# Patient Record
Sex: FEMALE | Race: OTHER
[De-identification: names, ages, dates, MRNs, and addresses within clinical notes are randomized per-mention and may not be internally consistent; named-entity substitution may affect disease eponyms.]

---

## 2017-02-22 ENCOUNTER — RECORD ABSTRACTING (OUTPATIENT)
Age: 72
End: 2017-02-22

## 2017-02-22 DIAGNOSIS — Z78.9 OTHER SPECIFIED HEALTH STATUS: ICD-10-CM

## 2017-02-22 DIAGNOSIS — I10 ESSENTIAL (PRIMARY) HYPERTENSION: ICD-10-CM

## 2017-02-22 DIAGNOSIS — E78.00 PURE HYPERCHOLESTEROLEMIA, UNSPECIFIED: ICD-10-CM

## 2017-02-22 RX ORDER — IBUPROFEN 800 MG/1
TABLET, FILM COATED ORAL
Refills: 0 | Status: ACTIVE | COMMUNITY

## 2017-02-22 RX ORDER — SIMVASTATIN 80 MG/1
TABLET, FILM COATED ORAL
Refills: 0 | Status: ACTIVE | COMMUNITY

## 2017-02-22 RX ORDER — LISINOPRIL 30 MG/1
TABLET ORAL
Refills: 0 | Status: ACTIVE | COMMUNITY

## 2017-05-30 ENCOUNTER — OUTPATIENT (OUTPATIENT)
Dept: OUTPATIENT SERVICES | Facility: HOSPITAL | Age: 72
LOS: 1 days | Discharge: HOME | End: 2017-05-30

## 2017-06-28 DIAGNOSIS — M25.711 OSTEOPHYTE, RIGHT SHOULDER: ICD-10-CM

## 2019-10-26 ENCOUNTER — EMERGENCY (EMERGENCY)
Facility: HOSPITAL | Age: 74
LOS: 0 days | Discharge: HOME | End: 2019-10-26
Admitting: EMERGENCY MEDICINE
Payer: MEDICARE

## 2019-10-26 VITALS
HEART RATE: 66 BPM | DIASTOLIC BLOOD PRESSURE: 84 MMHG | SYSTOLIC BLOOD PRESSURE: 197 MMHG | OXYGEN SATURATION: 96 % | RESPIRATION RATE: 18 BRPM | TEMPERATURE: 98 F

## 2019-10-26 DIAGNOSIS — Z88.2 ALLERGY STATUS TO SULFONAMIDES: ICD-10-CM

## 2019-10-26 DIAGNOSIS — H91.91 UNSPECIFIED HEARING LOSS, RIGHT EAR: ICD-10-CM

## 2019-10-26 DIAGNOSIS — H92.09 OTALGIA, UNSPECIFIED EAR: ICD-10-CM

## 2019-10-26 DIAGNOSIS — H61.21 IMPACTED CERUMEN, RIGHT EAR: ICD-10-CM

## 2019-10-26 PROCEDURE — 99282 EMERGENCY DEPT VISIT SF MDM: CPT

## 2019-10-26 RX ORDER — CARBAMIDE PEROXIDE 81.86 MG/ML
1 SOLUTION/ DROPS AURICULAR (OTIC) ONCE
Refills: 0 | Status: COMPLETED | OUTPATIENT
Start: 2019-10-26 | End: 2019-10-26

## 2019-10-26 RX ADMIN — CARBAMIDE PEROXIDE 1 DROP(S): 81.86 SOLUTION/ DROPS AURICULAR (OTIC) at 10:32

## 2019-10-26 NOTE — ED ADULT NURSE NOTE - OBJECTIVE STATEMENT
pt 73 y/o female presents to ED c/o left ear pain for 4 days , pt denies any fevers or drainage, denies any other pain. pt states it is harder to hear on left side.

## 2019-10-26 NOTE — ED PROVIDER NOTE - PHYSICAL EXAMINATION
CONSTITUTIONAL: Well-appearing; well-nourished; in no apparent distress.   EYES: PERRL; EOM intact.   ENT: Left ear: small amount of cerumen noted. Clear TM. Rt ear: no mastoid ttp. + cerumen impaction. No discharge. No erythema. Cannot visualize TM due to cerumen. normal nose; no rhinorrhea; normal pharynx with no tonsillar hypertrophy.   NECK: Supple; non-tender; no cervical lymphadenopathy.   CARDIOVASCULAR: Normal S1, S2; no murmurs, rubs, or gallops.   RESPIRATORY: Normal chest excursion with respiration; breath sounds clear and equal bilaterally; no wheezes, rhonchi, or rales.  MS: No evidence of trauma or deformity. Normal ROM in all four extremities; non-tender to palpation; distal pulses are normal.   SKIN: Normal for age and race; warm; dry; good turgor; no apparent lesions or exudate.   NEURO/PSYCH: A & O x 4; grossly unremarkable. mood and manner are appropriate.

## 2019-10-26 NOTE — ED PROVIDER NOTE - OBJECTIVE STATEMENT
74 year old F with hx of HTN c/o rt sided ear fullness/discomfort and decreased hearing x 3- 4 days. Denies any  fever/chills, headache, nausea, vomiting, discharge, uri symptoms, cough, sob, hx of ear infections.

## 2019-10-26 NOTE — ED PROVIDER NOTE - NS ED ROS FT
Constitutional: no fever, chills, no recent weight loss, change in appetite or malaise  Eyes: no redness/discharge/pain/vision changes  ENT: + L sided ear fullness/decreased hearing. no rhinorrhea/sore throat  Cardiac: No chest pain, SOB or edema.  Respiratory: No cough or respiratory distress  GI: No nausea, vomiting, diarrhea or abdominal pain.  MS: no pain to back or extremities, no loss of ROM, no weakness  Neuro: No headache or weakness.   Skin: No skin rash.  Except as documented in the HPI, all other systems are negative.

## 2019-10-26 NOTE — ED PROVIDER NOTE - PATIENT PORTAL LINK FT
You can access the FollowMyHealth Patient Portal offered by [facility] by registering at the following website: [email or web address]. By joining [employer]’s FollowMyHealth portal, you will also be able to view your health information using other applications (apps) compatible with our system.

## 2019-10-26 NOTE — ED ADULT NURSE NOTE - CHIEF COMPLAINT
LOLITA STELEEVJ  : 1929 15:35:01  ACCOUNT:  [id number]  HOME PHONE:  [phone]  WORK PHONE:       Received message to call patient with carotid US results. Attempted to call patient several times without success. Left another voicemail with results.    Per MVD review of carotid US:  Mild, No change, CPM    Blaire Irwin RN     The patient is a 74y Female complaining of ear pain.

## 2022-05-30 ENCOUNTER — EMERGENCY (EMERGENCY)
Facility: HOSPITAL | Age: 77
LOS: 0 days | Discharge: HOME | End: 2022-05-30
Attending: EMERGENCY MEDICINE | Admitting: EMERGENCY MEDICINE
Payer: MEDICARE

## 2022-05-30 VITALS
HEIGHT: 60 IN | TEMPERATURE: 98 F | DIASTOLIC BLOOD PRESSURE: 76 MMHG | HEART RATE: 76 BPM | RESPIRATION RATE: 18 BRPM | SYSTOLIC BLOOD PRESSURE: 171 MMHG | WEIGHT: 154.98 LBS | OXYGEN SATURATION: 98 %

## 2022-05-30 DIAGNOSIS — R30.0 DYSURIA: ICD-10-CM

## 2022-05-30 DIAGNOSIS — I10 ESSENTIAL (PRIMARY) HYPERTENSION: ICD-10-CM

## 2022-05-30 DIAGNOSIS — R10.30 LOWER ABDOMINAL PAIN, UNSPECIFIED: ICD-10-CM

## 2022-05-30 DIAGNOSIS — E78.5 HYPERLIPIDEMIA, UNSPECIFIED: ICD-10-CM

## 2022-05-30 DIAGNOSIS — E11.9 TYPE 2 DIABETES MELLITUS WITHOUT COMPLICATIONS: ICD-10-CM

## 2022-05-30 DIAGNOSIS — Z88.2 ALLERGY STATUS TO SULFONAMIDES: ICD-10-CM

## 2022-05-30 DIAGNOSIS — Z88.8 ALLERGY STATUS TO OTHER DRUGS, MEDICAMENTS AND BIOLOGICAL SUBSTANCES: ICD-10-CM

## 2022-05-30 LAB
APPEARANCE UR: CLEAR — SIGNIFICANT CHANGE UP
BACTERIA # UR AUTO: NEGATIVE — SIGNIFICANT CHANGE UP
BILIRUB UR-MCNC: NEGATIVE — SIGNIFICANT CHANGE UP
COLOR SPEC: YELLOW — SIGNIFICANT CHANGE UP
DIFF PNL FLD: NEGATIVE — SIGNIFICANT CHANGE UP
EPI CELLS # UR: 1 /HPF — SIGNIFICANT CHANGE UP (ref 0–5)
GLUCOSE UR QL: NEGATIVE — SIGNIFICANT CHANGE UP
HYALINE CASTS # UR AUTO: 1 /LPF — SIGNIFICANT CHANGE UP (ref 0–7)
KETONES UR-MCNC: NEGATIVE — SIGNIFICANT CHANGE UP
LEUKOCYTE ESTERASE UR-ACNC: ABNORMAL
NITRITE UR-MCNC: NEGATIVE — SIGNIFICANT CHANGE UP
PH UR: 8 — SIGNIFICANT CHANGE UP (ref 5–8)
PROT UR-MCNC: NEGATIVE — SIGNIFICANT CHANGE UP
RBC CASTS # UR COMP ASSIST: 4 /HPF — SIGNIFICANT CHANGE UP (ref 0–4)
SP GR SPEC: 1.01 — SIGNIFICANT CHANGE UP (ref 1.01–1.03)
UROBILINOGEN FLD QL: SIGNIFICANT CHANGE UP
WBC UR QL: 4 /HPF — SIGNIFICANT CHANGE UP (ref 0–5)

## 2022-05-30 PROCEDURE — 99284 EMERGENCY DEPT VISIT MOD MDM: CPT

## 2022-05-30 RX ORDER — NITROFURANTOIN MACROCRYSTAL 50 MG
1 CAPSULE ORAL
Qty: 10 | Refills: 0
Start: 2022-05-30 | End: 2022-06-03

## 2022-05-30 NOTE — ED PROVIDER NOTE - ATTENDING CONTRIBUTION TO CARE
I personally evaluated the patient. I reviewed the Resident´s or Physician Assistant´s note (as assigned above), and agree with the findings and plan except as documented in my note.    76-year-old female presents emergency department for dysuria.  Denies constitutional symptoms fever chills or back pain.  No vomiting, no GI symptoms.  No GYN symptoms.    Has minimal outpatient GYN work-up to date.    The review of systems otherwise unremarkable  GENERAL: female in no distress.   HEENT: EOMI non icteric   CHEST: normal work of breathing noted. CTA bilateral.   CV: pulses intact S1S2 regular  ABD: soft, non rigid, non distended no rebound  NEURO: AAO 3 no focal deficits  SKIN: normal no pallor  PSYCH: normal mood & mentation    Impression: Dysuria    Plan: Labs, reevaluation

## 2022-05-30 NOTE — ED PROVIDER NOTE - NSFOLLOWUPINSTRUCTIONS_ED_ALL_ED_FT
Dysuria      Dysuria is pain or discomfort during urination. The pain or discomfort may be felt in the part of the body that drains urine from the bladder (urethra) or in the surrounding tissue of the genitals. The pain may also be felt in the groin area, lower abdomen, or lower back.    You may have to urinate frequently or have the sudden feeling that you have to urinate (urgency). Dysuria can affect anyone, but it is more common in females. Dysuria can be caused by many different things, including:  •Urinary tract infection.      •Kidney stones or bladder stones.      •Certain STIs (sexually transmitted infections), such as chlamydia.      •Dehydration.      •Inflammation of the tissues of the vagina.      •Use of certain medicines.      •Use of certain soaps or scented products that cause irritation.        Follow these instructions at home:    Medicines     •Take over-the-counter and prescription medicines only as told by your health care provider.      •If you were prescribed an antibiotic medicine, take it as told by your health care provider. Do not stop taking the antibiotic even if you start to feel better.        Eating and drinking      •Drink enough fluid to keep your urine pale yellow.      •Avoid caffeinated beverages, tea, and alcohol. These beverages can irritate the bladder and make dysuria worse. In males, alcohol may irritate the prostate.      General instructions     •Watch your condition for any changes.      •Urinate often. Avoid holding urine for long periods of time.      •If you are female, you should wipe from front to back after urinating or having a bowel movement. Use each piece of toilet paper only once.      •Empty your bladder after sex.      •Keep all follow-up visits. This is important.      •If you had any tests done to find the cause of dysuria, it is up to you to get your test results. Ask your health care provider, or the department that is doing the test, when your results will be ready.        Contact a health care provider if:    •You have a fever.      •You develop pain in your back or sides.      •You have nausea or vomiting.      •You have blood in your urine.      •You are not urinating as often as you usually do.        Get help right away if:    •Your pain is severe and not relieved with medicines.      •You cannot eat or drink without vomiting.      •You are confused.      •You have a rapid heartbeat while resting.      •You have shaking or chills.      •You feel extremely weak.        Summary    •Dysuria is pain or discomfort while urinating. Many different conditions can lead to dysuria.      •If you have dysuria, you may have to urinate frequently or have the sudden feeling that you have to urinate (urgency).      •Watch your condition for any changes. Keep all follow-up visits.      •Make sure that you urinate often and drink enough fluid to keep your urine pale yellow.      This information is not intended to replace advice given to you by your health care provider. Make sure you discuss any questions you have with your health care provider.

## 2022-05-30 NOTE — ED PROVIDER NOTE - NS ED ROS FT
GEN:  no fever, no chills, no generalized weakness  NEURO:  no dizziness  GI:  no nausea, no vomiting, +abdominal pain  :  +dysuria, no urinary frequency, no hematuria, +vaginal spotting  MSK:  no joint pain, no edema  SKIN:  no rash, no bruising

## 2022-05-30 NOTE — ED PROVIDER NOTE - PATIENT PORTAL LINK FT
You can access the FollowMyHealth Patient Portal offered by [facility] by registering at the following website: [email or web address]. By joining [employer]’s FollowMyHealth portal, you will also be able to view your health information using other applications (apps) compatible with our system.

## 2022-05-30 NOTE — ED PROVIDER NOTE - NSFOLLOWUPCLINICS_GEN_ALL_ED_FT
[facility]  OB/GYN  [address] [place] [address]  Phone: [phone]  Fax:   Follow Up Time: 1-3 Days

## 2022-05-30 NOTE — ED PROVIDER NOTE - CLINICAL SUMMARY MEDICAL DECISION MAKING FREE TEXT BOX
68-year female presents the emergency department for dysuria.  Had screening exam, labs, reevaluation.  Urine demonstrates minimal white cells but small leukocyte esterase, GYN exam reveals no source of dysuria.  Will treat prophylactically with Macrobid and can continue as outpatient follow-up with, likely early UTI.  Patient suggested to see outpatient GYN for screening examinations as well.

## 2022-05-30 NOTE — ED PROVIDER NOTE - OBJECTIVE STATEMENT
75 yo female, PMHx of DM, HLD, HTN, presents with dysuria x6 days, worsening over time, no alleviating factors, associated with vaginal spotting, suprapubic abdominal discomfort, foul odor. Denies fevers, chill, back pain, nausea, vomiting, hematuria.

## 2022-05-30 NOTE — ED ADULT NURSE NOTE - NSIMPLEMENTINTERV_GEN_ALL_ED
Implemented All Universal Safety Interventions:  [place] to call system. Call bell, personal items and telephone within reach. Instruct patient to call for assistance. Room bathroom lighting operational. Non-slip footwear when patient is off stretcher. Physically safe environment: no spills, clutter or unnecessary equipment. Stretcher in lowest position, wheels locked, appropriate side rails in place.

## 2022-05-30 NOTE — ED PROVIDER NOTE - PHYSICAL EXAMINATION
CONSTITUTIONAL: Well-developed; well-nourished; in no acute distress.   SKIN: warm, dry  HEAD: Normocephalic  NECK: Supple  CARD: S1, S2 normal; no murmurs, gallops, or rubs. Regular rate and rhythm.   RESP: No wheezes, rales or rhonchi.  ABD: soft, nondistended. +suprapubic tenderness. no cva tenderness  : no blood or discharge. no adnexal tenderness  EXT: Normal ROM.  No clubbing, cyanosis or edema.   NEURO: Alert, oriented, grossly unremarkable.  PSYCH: Cooperative, appropriate.

## 2022-05-31 LAB
CULTURE RESULTS: SIGNIFICANT CHANGE UP
SPECIMEN SOURCE: SIGNIFICANT CHANGE UP

## 2022-06-03 ENCOUNTER — APPOINTMENT (OUTPATIENT)
Dept: OBGYN | Facility: CLINIC | Age: 77
End: 2022-06-03

## 2022-06-03 ENCOUNTER — OUTPATIENT (OUTPATIENT)
Dept: OUTPATIENT SERVICES | Facility: HOSPITAL | Age: 77
LOS: 1 days | Discharge: HOME | End: 2022-06-03

## 2022-06-03 VITALS
DIASTOLIC BLOOD PRESSURE: 70 MMHG | SYSTOLIC BLOOD PRESSURE: 110 MMHG | BODY MASS INDEX: 30.43 KG/M2 | HEIGHT: 60 IN | WEIGHT: 155 LBS

## 2022-06-03 DIAGNOSIS — Z00.00 ENCOUNTER FOR GENERAL ADULT MEDICAL EXAMINATION W/OUT ABNORMAL FINDINGS: ICD-10-CM

## 2022-06-03 DIAGNOSIS — N36.2 URETHRAL CARUNCLE: ICD-10-CM

## 2022-06-03 DIAGNOSIS — Z01.419 ENCOUNTER FOR GYNECOLOGICAL EXAMINATION (GENERAL) (ROUTINE) W/OUT ABNORMAL FINDINGS: ICD-10-CM

## 2022-06-03 PROCEDURE — 99387 INIT PM E/M NEW PAT 65+ YRS: CPT | Mod: 25,GC

## 2022-06-03 PROCEDURE — 99213 OFFICE O/P EST LOW 20 MIN: CPT | Mod: 25,GC

## 2022-06-03 PROCEDURE — 58100 BIOPSY OF UTERUS LINING: CPT | Mod: 52,GC

## 2022-06-03 NOTE — COUNSELING
[Lab Results] : lab results [Nutrition/ Exercise/ Weight Management] : nutrition, exercise, weight management

## 2022-06-06 DIAGNOSIS — Z01.419 ENCOUNTER FOR GYNECOLOGICAL EXAMINATION (GENERAL) (ROUTINE) WITHOUT ABNORMAL FINDINGS: ICD-10-CM

## 2022-06-06 DIAGNOSIS — Z00.00 ENCOUNTER FOR GENERAL ADULT MEDICAL EXAMINATION WITHOUT ABNORMAL FINDINGS: ICD-10-CM

## 2022-06-06 DIAGNOSIS — N36.2 URETHRAL CARUNCLE: ICD-10-CM

## 2022-06-07 LAB — HPV HIGH+LOW RISK DNA PNL CVX: NOT DETECTED

## 2022-06-11 LAB — CYTOLOGY CVX/VAG DOC THIN PREP: ABNORMAL

## 2022-07-28 NOTE — PHYSICAL EXAM
[Chaperone Present] : A chaperone was present in the examining room during all aspects of the physical examination [Appropriately responsive] : appropriately responsive [Alert] : alert [No Acute Distress] : no acute distress [Soft] : soft [Non-tender] : non-tender [Non-distended] : non-distended [No Lesions] : no lesions [No Mass] : no mass [Vulvar Atrophy] : vulvar atrophy [Labia Majora] : normal [Labia Minora] : normal [Atrophy] : atrophy [No Bleeding] : There was no active vaginal bleeding [Cervical Stenosis] : cervical stenosis [Normal] : normal [FreeTextEntry3] : 1cm urethral caruncle noted [FreeTextEntry5] : c

## 2022-07-28 NOTE — DISCUSSION/SUMMARY
[FreeTextEntry1] : 77yo P7 here for ED follow up with bleeding likely 2/2 to large urethral caruncle\par \par Bleeding of unknown origin likely 2/2 caruncle\par -bleeding unlikely to be from vagina or uterus, cervix stenotic, sent for TVUS to assess endometrial cavity\par -EMB was attempted but unsuccessful due to cervical stenosis, will f/u endometrial thickness on sono\par -urology referral given\par -f/u 2 months to discuss results\par \par health maintenance\par -script for dexa given\par -papsmear w/ hpv done today\par

## 2022-07-28 NOTE — HISTORY OF PRESENT ILLNESS
[Y] : Positive pregnancy history [No] : Patient does not have concerns regarding sex [Previously active] : previously active [PGxTotal] : 7 [[facility]] : 7 [PGHxPremature] : 0 [PGHxAbortions] : 0 [[facility] CenterxLiving] : 7 [FreeTextEntry1] : [id number]

## 2022-07-28 NOTE — PROCEDURE
[Cervical Pap Smear] : cervical Pap smear [Liquid Base] : liquid base [Tolerated Well] : the patient tolerated the procedure well [No Complications] : there were no complications [Endometrial Biopsy] : Endometrial biopsy [Post-Menop. Bleeding] : post-menopausal bleeding [Risks] : risks [Benefits] : benefits [Alternatives] : alternatives [No Premedication] : No premedication [Tenaculum] : Tenaculum [de-identified] : postmenopausal [de-identified] : procedure unsuccessful, unable to enter stenotic cervical os

## 2022-08-05 ENCOUNTER — APPOINTMENT (OUTPATIENT)
Dept: OBGYN | Facility: CLINIC | Age: 77
End: 2022-08-05

## 2022-08-18 ENCOUNTER — INPATIENT (INPATIENT)
Facility: HOSPITAL | Age: 77
LOS: 5 days | Discharge: HOME HEALTH | End: 2022-08-24
Attending: HOSPITALIST | Admitting: HOSPITALIST

## 2022-08-18 VITALS
HEIGHT: 60 IN | SYSTOLIC BLOOD PRESSURE: 136 MMHG | RESPIRATION RATE: 18 BRPM | HEART RATE: 65 BPM | TEMPERATURE: 99 F | OXYGEN SATURATION: 96 % | DIASTOLIC BLOOD PRESSURE: 64 MMHG | WEIGHT: 149.91 LBS

## 2022-08-18 LAB
ALBUMIN SERPL ELPH-MCNC: 3.6 G/DL — SIGNIFICANT CHANGE UP (ref 3.5–5.2)
ALP SERPL-CCNC: 94 U/L — SIGNIFICANT CHANGE UP (ref 30–115)
ALT FLD-CCNC: 10 U/L — SIGNIFICANT CHANGE UP (ref 0–41)
ANION GAP SERPL CALC-SCNC: 12 MMOL/L — SIGNIFICANT CHANGE UP (ref 7–14)
APPEARANCE UR: CLEAR — SIGNIFICANT CHANGE UP
AST SERPL-CCNC: 16 U/L — SIGNIFICANT CHANGE UP (ref 0–41)
BACTERIA # UR AUTO: ABNORMAL
BASOPHILS # BLD AUTO: 0.03 K/UL — SIGNIFICANT CHANGE UP (ref 0–0.2)
BASOPHILS NFR BLD AUTO: 0.3 % — SIGNIFICANT CHANGE UP (ref 0–1)
BILIRUB SERPL-MCNC: 0.6 MG/DL — SIGNIFICANT CHANGE UP (ref 0.2–1.2)
BILIRUB UR-MCNC: NEGATIVE — SIGNIFICANT CHANGE UP
BUN SERPL-MCNC: 14 MG/DL — SIGNIFICANT CHANGE UP (ref 10–20)
CALCIUM SERPL-MCNC: 8.7 MG/DL — SIGNIFICANT CHANGE UP (ref 8.5–10.1)
CHLORIDE SERPL-SCNC: 97 MMOL/L — LOW (ref 98–110)
CO2 SERPL-SCNC: 25 MMOL/L — SIGNIFICANT CHANGE UP (ref 17–32)
COLOR SPEC: SIGNIFICANT CHANGE UP
CREAT SERPL-MCNC: 0.8 MG/DL — SIGNIFICANT CHANGE UP (ref 0.7–1.5)
DIFF PNL FLD: ABNORMAL
EGFR: 75 ML/MIN/1.73M2 — SIGNIFICANT CHANGE UP
EOSINOPHIL # BLD AUTO: 0 K/UL — SIGNIFICANT CHANGE UP (ref 0–0.7)
EOSINOPHIL NFR BLD AUTO: 0 % — SIGNIFICANT CHANGE UP (ref 0–8)
EPI CELLS # UR: 1 /HPF — SIGNIFICANT CHANGE UP (ref 0–5)
GLUCOSE BLDC GLUCOMTR-MCNC: 129 MG/DL — HIGH (ref 70–99)
GLUCOSE SERPL-MCNC: 111 MG/DL — HIGH (ref 70–99)
GLUCOSE UR QL: NEGATIVE — SIGNIFICANT CHANGE UP
HCT VFR BLD CALC: 34.5 % — LOW (ref 37–47)
HGB BLD-MCNC: 11.5 G/DL — LOW (ref 12–16)
HYALINE CASTS # UR AUTO: 1 /LPF — SIGNIFICANT CHANGE UP (ref 0–7)
IMM GRANULOCYTES NFR BLD AUTO: 0.5 % — HIGH (ref 0.1–0.3)
KETONES UR-MCNC: NEGATIVE — SIGNIFICANT CHANGE UP
LEUKOCYTE ESTERASE UR-ACNC: ABNORMAL
LIDOCAIN IGE QN: 15 U/L — SIGNIFICANT CHANGE UP (ref 7–60)
LYMPHOCYTES # BLD AUTO: 1.54 K/UL — SIGNIFICANT CHANGE UP (ref 1.2–3.4)
LYMPHOCYTES # BLD AUTO: 15.9 % — LOW (ref 20.5–51.1)
MCHC RBC-ENTMCNC: 26.5 PG — LOW (ref 27–31)
MCHC RBC-ENTMCNC: 33.3 G/DL — SIGNIFICANT CHANGE UP (ref 32–37)
MCV RBC AUTO: 79.5 FL — LOW (ref 81–99)
MONOCYTES # BLD AUTO: 0.96 K/UL — HIGH (ref 0.1–0.6)
MONOCYTES NFR BLD AUTO: 9.9 % — HIGH (ref 1.7–9.3)
NEUTROPHILS # BLD AUTO: 7.1 K/UL — HIGH (ref 1.4–6.5)
NEUTROPHILS NFR BLD AUTO: 73.4 % — SIGNIFICANT CHANGE UP (ref 42.2–75.2)
NITRITE UR-MCNC: NEGATIVE — SIGNIFICANT CHANGE UP
NRBC # BLD: 0 /100 WBCS — SIGNIFICANT CHANGE UP (ref 0–0)
PH UR: 7.5 — SIGNIFICANT CHANGE UP (ref 5–8)
PLATELET # BLD AUTO: 228 K/UL — SIGNIFICANT CHANGE UP (ref 130–400)
POTASSIUM SERPL-MCNC: 3.4 MMOL/L — LOW (ref 3.5–5)
POTASSIUM SERPL-SCNC: 3.4 MMOL/L — LOW (ref 3.5–5)
PROT SERPL-MCNC: 5.7 G/DL — LOW (ref 6–8)
PROT UR-MCNC: SIGNIFICANT CHANGE UP
RBC # BLD: 4.34 M/UL — SIGNIFICANT CHANGE UP (ref 4.2–5.4)
RBC # FLD: 15.4 % — HIGH (ref 11.5–14.5)
RBC CASTS # UR COMP ASSIST: 5 /HPF — HIGH (ref 0–4)
SARS-COV-2 RNA SPEC QL NAA+PROBE: SIGNIFICANT CHANGE UP
SODIUM SERPL-SCNC: 134 MMOL/L — LOW (ref 135–146)
SP GR SPEC: 1.01 — SIGNIFICANT CHANGE UP (ref 1.01–1.03)
UROBILINOGEN FLD QL: SIGNIFICANT CHANGE UP
WBC # BLD: 9.68 K/UL — SIGNIFICANT CHANGE UP (ref 4.8–10.8)
WBC # FLD AUTO: 9.68 K/UL — SIGNIFICANT CHANGE UP (ref 4.8–10.8)
WBC UR QL: 69 /HPF — HIGH (ref 0–5)

## 2022-08-18 PROCEDURE — 99285 EMERGENCY DEPT VISIT HI MDM: CPT

## 2022-08-18 PROCEDURE — 93010 ELECTROCARDIOGRAM REPORT: CPT

## 2022-08-18 PROCEDURE — 74177 CT ABD & PELVIS W/CONTRAST: CPT | Mod: 26,MA

## 2022-08-18 PROCEDURE — 99223 1ST HOSP IP/OBS HIGH 75: CPT

## 2022-08-18 RX ORDER — LANOLIN ALCOHOL/MO/W.PET/CERES
3 CREAM (GRAM) TOPICAL AT BEDTIME
Refills: 0 | Status: DISCONTINUED | OUTPATIENT
Start: 2022-08-18 | End: 2022-08-24

## 2022-08-18 RX ORDER — ONDANSETRON 8 MG/1
4 TABLET, FILM COATED ORAL EVERY 8 HOURS
Refills: 0 | Status: DISCONTINUED | OUTPATIENT
Start: 2022-08-18 | End: 2022-08-24

## 2022-08-18 RX ORDER — CEFEPIME 1 G/1
2000 INJECTION, POWDER, FOR SOLUTION INTRAMUSCULAR; INTRAVENOUS EVERY 12 HOURS
Refills: 0 | Status: DISCONTINUED | OUTPATIENT
Start: 2022-08-19 | End: 2022-08-21

## 2022-08-18 RX ORDER — ATORVASTATIN CALCIUM 80 MG/1
20 TABLET, FILM COATED ORAL AT BEDTIME
Refills: 0 | Status: DISCONTINUED | OUTPATIENT
Start: 2022-08-18 | End: 2022-08-24

## 2022-08-18 RX ORDER — CHOLECALCIFEROL (VITAMIN D3) 125 MCG
1 CAPSULE ORAL
Qty: 0 | Refills: 0 | DISCHARGE

## 2022-08-18 RX ORDER — ATORVASTATIN CALCIUM 80 MG/1
1 TABLET, FILM COATED ORAL
Qty: 0 | Refills: 0 | DISCHARGE

## 2022-08-18 RX ORDER — SODIUM CHLORIDE 9 MG/ML
1000 INJECTION, SOLUTION INTRAVENOUS
Refills: 0 | Status: DISCONTINUED | OUTPATIENT
Start: 2022-08-18 | End: 2022-08-22

## 2022-08-18 RX ORDER — CHOLECALCIFEROL (VITAMIN D3) 125 MCG
1000 CAPSULE ORAL DAILY
Refills: 0 | Status: DISCONTINUED | OUTPATIENT
Start: 2022-08-18 | End: 2022-08-24

## 2022-08-18 RX ORDER — POTASSIUM CHLORIDE 20 MEQ
40 PACKET (EA) ORAL ONCE
Refills: 0 | Status: DISCONTINUED | OUTPATIENT
Start: 2022-08-18 | End: 2022-08-24

## 2022-08-18 RX ORDER — LOSARTAN POTASSIUM 100 MG/1
100 TABLET, FILM COATED ORAL DAILY
Refills: 0 | Status: DISCONTINUED | OUTPATIENT
Start: 2022-08-19 | End: 2022-08-24

## 2022-08-18 RX ORDER — DEXTROSE 50 % IN WATER 50 %
25 SYRINGE (ML) INTRAVENOUS ONCE
Refills: 0 | Status: DISCONTINUED | OUTPATIENT
Start: 2022-08-18 | End: 2022-08-22

## 2022-08-18 RX ORDER — LACTOBACILLUS ACIDOPHILUS 100MM CELL
2 CAPSULE ORAL
Qty: 0 | Refills: 0 | DISCHARGE

## 2022-08-18 RX ORDER — DICLOFENAC SODIUM 30 MG/G
1 GEL TOPICAL
Qty: 0 | Refills: 0 | DISCHARGE

## 2022-08-18 RX ORDER — ASPIRIN/CALCIUM CARB/MAGNESIUM 324 MG
1 TABLET ORAL
Qty: 0 | Refills: 0 | DISCHARGE

## 2022-08-18 RX ORDER — PSYLLIUM SEED (WITH DEXTROSE)
1 POWDER (GRAM) ORAL DAILY
Refills: 0 | Status: DISCONTINUED | OUTPATIENT
Start: 2022-08-18 | End: 2022-08-24

## 2022-08-18 RX ORDER — CEFEPIME 1 G/1
2000 INJECTION, POWDER, FOR SOLUTION INTRAMUSCULAR; INTRAVENOUS ONCE
Refills: 0 | Status: COMPLETED | OUTPATIENT
Start: 2022-08-18 | End: 2022-08-18

## 2022-08-18 RX ORDER — INSULIN LISPRO 100/ML
VIAL (ML) SUBCUTANEOUS
Refills: 0 | Status: DISCONTINUED | OUTPATIENT
Start: 2022-08-18 | End: 2022-08-22

## 2022-08-18 RX ORDER — GLUCAGON INJECTION, SOLUTION 0.5 MG/.1ML
1 INJECTION, SOLUTION SUBCUTANEOUS ONCE
Refills: 0 | Status: DISCONTINUED | OUTPATIENT
Start: 2022-08-18 | End: 2022-08-22

## 2022-08-18 RX ORDER — ACETAMINOPHEN 500 MG
975 TABLET ORAL ONCE
Refills: 0 | Status: COMPLETED | OUTPATIENT
Start: 2022-08-18 | End: 2022-08-18

## 2022-08-18 RX ORDER — DEXTROSE 50 % IN WATER 50 %
15 SYRINGE (ML) INTRAVENOUS ONCE
Refills: 0 | Status: DISCONTINUED | OUTPATIENT
Start: 2022-08-18 | End: 2022-08-22

## 2022-08-18 RX ORDER — HYDROCHLOROTHIAZIDE 25 MG
TABLET ORAL
Refills: 0 | Status: DISCONTINUED | OUTPATIENT
Start: 2022-08-18 | End: 2022-08-19

## 2022-08-18 RX ORDER — SODIUM CHLORIDE 9 MG/ML
1000 INJECTION, SOLUTION INTRAVENOUS ONCE
Refills: 0 | Status: COMPLETED | OUTPATIENT
Start: 2022-08-18 | End: 2022-08-18

## 2022-08-18 RX ORDER — CEFTRIAXONE 500 MG/1
1000 INJECTION, POWDER, FOR SOLUTION INTRAMUSCULAR; INTRAVENOUS ONCE
Refills: 0 | Status: COMPLETED | OUTPATIENT
Start: 2022-08-18 | End: 2022-08-18

## 2022-08-18 RX ORDER — LOSARTAN POTASSIUM 100 MG/1
TABLET, FILM COATED ORAL
Refills: 0 | Status: DISCONTINUED | OUTPATIENT
Start: 2022-08-18 | End: 2022-08-24

## 2022-08-18 RX ORDER — HYDROCHLOROTHIAZIDE 25 MG
25 TABLET ORAL DAILY
Refills: 0 | Status: DISCONTINUED | OUTPATIENT
Start: 2022-08-19 | End: 2022-08-19

## 2022-08-18 RX ORDER — LOSARTAN POTASSIUM 100 MG/1
100 TABLET, FILM COATED ORAL ONCE
Refills: 0 | Status: COMPLETED | OUTPATIENT
Start: 2022-08-18 | End: 2022-08-18

## 2022-08-18 RX ORDER — LACTOBACILLUS ACIDOPHILUS 100MM CELL
1 CAPSULE ORAL DAILY
Refills: 0 | Status: DISCONTINUED | OUTPATIENT
Start: 2022-08-18 | End: 2022-08-24

## 2022-08-18 RX ORDER — LIDOCAINE 4 G/100G
1 CREAM TOPICAL DAILY
Refills: 0 | Status: DISCONTINUED | OUTPATIENT
Start: 2022-08-18 | End: 2022-08-24

## 2022-08-18 RX ORDER — METFORMIN HYDROCHLORIDE 850 MG/1
1 TABLET ORAL
Qty: 0 | Refills: 0 | DISCHARGE

## 2022-08-18 RX ORDER — DEXTROSE 50 % IN WATER 50 %
12.5 SYRINGE (ML) INTRAVENOUS ONCE
Refills: 0 | Status: DISCONTINUED | OUTPATIENT
Start: 2022-08-18 | End: 2022-08-22

## 2022-08-18 RX ORDER — ASPIRIN/CALCIUM CARB/MAGNESIUM 324 MG
81 TABLET ORAL DAILY
Refills: 0 | Status: DISCONTINUED | OUTPATIENT
Start: 2022-08-18 | End: 2022-08-24

## 2022-08-18 RX ORDER — HYDROCHLOROTHIAZIDE 25 MG
25 TABLET ORAL ONCE
Refills: 0 | Status: COMPLETED | OUTPATIENT
Start: 2022-08-18 | End: 2022-08-18

## 2022-08-18 RX ORDER — IBUPROFEN 200 MG
600 TABLET ORAL ONCE
Refills: 0 | Status: COMPLETED | OUTPATIENT
Start: 2022-08-18 | End: 2022-08-18

## 2022-08-18 RX ORDER — ACETAMINOPHEN 500 MG
650 TABLET ORAL EVERY 6 HOURS
Refills: 0 | Status: DISCONTINUED | OUTPATIENT
Start: 2022-08-18 | End: 2022-08-24

## 2022-08-18 RX ORDER — GLUCOSAMINE HCL/CHONDROITIN SU 500-400 MG
1 CAPSULE ORAL
Qty: 0 | Refills: 0 | DISCHARGE

## 2022-08-18 RX ORDER — PSYLLIUM SEED (WITH DEXTROSE)
1 POWDER (GRAM) ORAL
Qty: 0 | Refills: 0 | DISCHARGE

## 2022-08-18 RX ORDER — CEFEPIME 1 G/1
INJECTION, POWDER, FOR SOLUTION INTRAMUSCULAR; INTRAVENOUS
Refills: 0 | Status: DISCONTINUED | OUTPATIENT
Start: 2022-08-18 | End: 2022-08-21

## 2022-08-18 RX ADMIN — Medication 975 MILLIGRAM(S): at 12:20

## 2022-08-18 RX ADMIN — SODIUM CHLORIDE 1000 MILLILITER(S): 9 INJECTION, SOLUTION INTRAVENOUS at 11:03

## 2022-08-18 RX ADMIN — Medication 650 MILLIGRAM(S): at 21:55

## 2022-08-18 RX ADMIN — Medication 1000 UNIT(S): at 23:04

## 2022-08-18 RX ADMIN — Medication 25 MILLIGRAM(S): at 23:02

## 2022-08-18 RX ADMIN — Medication 1 PACKET(S): at 23:03

## 2022-08-18 RX ADMIN — CEFTRIAXONE 100 MILLIGRAM(S): 500 INJECTION, POWDER, FOR SOLUTION INTRAMUSCULAR; INTRAVENOUS at 17:04

## 2022-08-18 RX ADMIN — ATORVASTATIN CALCIUM 20 MILLIGRAM(S): 80 TABLET, FILM COATED ORAL at 23:01

## 2022-08-18 RX ADMIN — LOSARTAN POTASSIUM 100 MILLIGRAM(S): 100 TABLET, FILM COATED ORAL at 22:59

## 2022-08-18 RX ADMIN — CEFEPIME 100 MILLIGRAM(S): 1 INJECTION, POWDER, FOR SOLUTION INTRAMUSCULAR; INTRAVENOUS at 22:59

## 2022-08-18 RX ADMIN — SODIUM CHLORIDE 100 MILLILITER(S): 9 INJECTION, SOLUTION INTRAVENOUS at 21:16

## 2022-08-18 RX ADMIN — Medication 650 MILLIGRAM(S): at 21:16

## 2022-08-18 RX ADMIN — Medication 600 MILLIGRAM(S): at 23:31

## 2022-08-18 NOTE — PATIENT PROFILE ADULT - FUNCTIONAL ASSESSMENT - BASIC MOBILITY 6.
4-calculated by average/Not able to assess (calculate score using [facility] averaging method)

## 2022-08-18 NOTE — ED PROVIDER NOTE - CLINICAL SUMMARY MEDICAL DECISION MAKING FREE TEXT BOX
Patient here with 3 days of lower back pain bilaterally and fever.  Patient found to have bilateral pyelonephritis.  Patient given antibiotics due to patient's advanced age, fever, and   pyelonephritis will admit.

## 2022-08-18 NOTE — PATIENT PROFILE ADULT - FALL HARM RISK - HARM RISK INTERVENTIONS
Assistance with ambulation/Assistance OOB with selected safe patient handling equipment/Communicate Risk of Fall with Harm to all staff/Monitor gait and stability/Reinforce activity limits and safety measures with patient and family/Sit up slowly, dangle for a short time, stand at bedside before walking/Tailored Fall Risk Interventions/Visual Cue: Yellow wristband and red socks/Bed in lowest position, wheels locked, appropriate side rails in place/Call bell, personal items and telephone in reach/Instruct patient to call for assistance before getting out of bed or chair/Non-slip footwear when patient is out of bed/[place] to call system/Physically safe environment - no spills, clutter or unnecessary equipment/Purposeful Proactive Rounding/Room/bathroom lighting operational, light cord in reach

## 2022-08-18 NOTE — H&P ADULT - NSHPSOCIALHISTORY_GEN_ALL_CORE
Pt is a non smoker social drinker w/ no recreational drug use  Lives at home w/ no needs  Recent travel to [place] and [place]  2 pet dogs at home  No recent sick contacts    Hx of UTIs    Covid vax'd x 2 + 2 or 3 boosters

## 2022-08-18 NOTE — ED PROVIDER NOTE - ADMIT DISPOSITION PRESENT ON ADMISSION SEPSIS
It's possible it's low d/t  medication.   Recheck next Monday and if not improved on unexpired meds I'll up dose No

## 2022-08-18 NOTE — ED PROVIDER NOTE - PHYSICAL EXAMINATION
CONSTITUTIONAL: Well-appearing; well-nourished; in no apparent distress.   HEAD: Normocephalic; atraumatic.   EYES: PERRL; EOM intact. Conjunctiva normal B/L.   ENT: Normal pharynx with no tonsillar hypertrophy. MMM.  NECK: Supple; non-tender; no cervical lymphadenopathy.   CHEST: Normal chest excursion with respiration.   CARDIOVASCULAR: Normal S1, S2; no murmurs, rubs, or gallops.   RESPIRATORY: Normal chest excursion with respiration; breath sounds clear and equal bilaterally; no wheezes, rhonchi, or rales.  GI/: Normal bowel sounds; non-distended; non-tender.  BACK: No evidence of trauma or deformity. Non-tender to palpation. b/l CVA tenderness, neg rovsing, neg mcburny, neg jane, no ecchymosis, no rash   EXT: Normal ROM in all four extremities; non-tender to palpation; distal pulses are normal. No leg edema B/L.   SKIN: Normal for age and race; warm; dry; good turgor.  NEURO: A & O x 4; CN 2-12 intact. Grossly unremarkable.

## 2022-08-18 NOTE — H&P ADULT - NSHPREVIEWOFSYSTEMS_GEN_ALL_CORE
General:	denies night sweats, wt changes, cold/heat intolerance    Skin: denies rashes, pruritis, nail/hair changes  	  Ophthalmologic: denies vision changes, denies eye discharge or irritation  	  ENMT: denies nasal discharge, hearing changes, mouth sores, difficulty swallowing    Respiratory and Thorax: endorses mild non productive cough, denies difficulty breathing, denies shortness of breath  	  Cardiovascular: denies CP, denies LANDIS, denies palpitation    Gastrointestinal: denies n/v/d (resolved)    Genitourinary: endorses frequency and urgency, denies burning, nocturia    Musculoskeletal: endorses chronic joint pain R knee R pollex    Neurological: denies dizziness, syncope, HA    Psychiatric: denies si/hi and hallucinations    Hematology/Lymphatics: denies easy bleeding/bruising    Endocrine: denies wt changes, hair/nail changes, denies cold/heat sensitivity

## 2022-08-18 NOTE — H&P ADULT - ASSESSMENT
77 yo woman pmhx dm, htn, hld, OA, multiple UTIs presents w/ +diana UA, BL CVA tenderness and imaging suggestive of pyelo    # acute pyelonephritis  - f/u BCx, f/u UCx  - sepsis r/o on admission  - s/p 1g ceftriaxone  - considering recent travel to area w/ known MDR organisms would c/w broad spectrum coverage and narrow once cultures return  - cefepime 2g q 12    # microcytic anemia   - f/u iron panel    # HTN  - pt on losartan-hctz 100/25 at home  - start losartan 100 hctz 25 while inpt    # DM  - pt only on metformin at home  - f/u A1C  - ISS for now, up titrate as needed    # hypokalemia  - replete, f/u K    # chronic OA  - tylenol/naproxen for pain    # HLD  - f/u lipid panel  - c/w asa/statin    DVT lovenox  Diet dash/tlc cc  GI famotidine while inpt

## 2022-08-18 NOTE — H&P ADULT - NSHPPHYSICALEXAM_GEN_ALL_CORE
CONSTITUTIONAL: NAD    EYES: PERRLA and symmetric, EOMI, No conjunctival or scleral injection, non-icteric    ENMT: Oral mucosa with moist membranes. No external nasal lesions; normal dentition; no gross hearing impairment noted.    NECK: Supple, symmetric and without tracheal deviation; thyroid gland not enlarged and without palpable masses    RESPIRATORY: No respiratory distress, no use of accessory muscles; CTA b/l, no wheezes, rales or rhonchi, no dullness or hyperresonance to percussion, no tactile fremitus, no subcutaneous emphysema    CARDIOVASCULAR: RRRR, +S1S2, no murmur appreciated, no rubs, no gallops; no JVD; no peripheral edema    Vascular: no carotid bruits; no abdominal bruit; carotid pulse palpable, radial pulse palpable, posterior tibialis pulse palpable    GASTROINTESTINAL: Mild suprapubic / LLQ tenderness; BL CVA tenderness L>R; Soft, non tender, non distended, no rebound, no guarding; No palpable masses; no hepatosplenomegaly; no hernia palpated;    MUSCULOSKELETAL: RLE ROM limited 2/2 chronic pain, crepitus appreciated, moves all extremities in plane of bed    SKIN: No rashes or ulcers noted; no subcutaneous nodules or induration palpable    NEUROLOGIC: CN II-XII intact; sensation intact in upper and lower extremities b/l to light touch; moves all extremities equally against resistance    PSYCHIATRIC: Appropriate insight/judgment; A+O x 3, mood and affect appropriate, recent/remote memory intact

## 2022-08-18 NOTE — H&P ADULT - ATTENDING COMMENTS
77 YO F with a PMH of HTN, DM2, and frequent UTIs who presents to the hospital w/ a c/o B/L flank pain for the past x 2 days. Associated with - hematuria, + fever/chills, + nausea, and - dysuria. Denies any CP, SOB, rashes, or sore throat. ROS is negative except as above.     In the ED, CT-AP w/ IV contrast showed B/L pyleitis and Left-sided pyelonephritis. Cultures obtained and pt started on IV ABXs (Ceftriaxone) and IVFs (LR).     FMHx: Reviewed, not relevant    Physical exam shows pt in NAD. VSS, afebrile, not hypoxic on RA. A&Ox3. Neuro exam without deficits, motor/sensory intact, no dysarthria, no facial asymmetry. Muscle strength/sensation intact. CTA B/L with no W/C/R. RRR, no M/G/R. ABD is soft and non-tender, normoactive BSs; Left/Right CVA TTP. LEs without swelling. No rashes. Labs and radiology as above.    Bilateral flank pain due to pyelonephritis; SIRs present on admission. FU cultures. IV ABXs (Cefepime). IVFs (LR). PRN pain meds. Anti-emetics PRN.    Hypokalemia. Replace. Repeat BMP in the AM.     Microcytic anemia, at baseline. Pt denies any bleeding symptoms. Replace PRN.     Hx of HTN, DM2, and frequent UTIs. Restart home meds, except as stated above. DVT PPX. Inform PCP of pt's admission to hospital. My note supersedes the residents note.     Date seen by [email or web address]

## 2022-08-18 NOTE — ED PROVIDER NOTE - OBJECTIVE STATEMENT
76 y F pmhx htn c/o flank pain b/l x3d. pt states that 3d ago felt progressive onset of back pain that radiates to the abdomen; pt states she has had fever the last 2 d unknown tmax. pt tolerating PO fluids and solids. denies n/v/sob/cp/bdysuria/bm changes.

## 2022-08-18 NOTE — PATIENT PROFILE ADULT - VISION (WITH CORRECTIVE LENSES IF THE PATIENT USUALLY WEARS THEM):
Partially impaired: cannot see medication labels or newsprint, but can see obstacles in path, and the surrounding layout; can count fingers at arm's length jaw

## 2022-08-18 NOTE — H&P ADULT - HISTORY OF PRESENT ILLNESS
Delightful 75yo woman w/ pmhx htn, dm, multiple uti, OA R knee presents after 2 days of fever and chills associated w/ nausea and BL flank pain.    Pt was in usual state of health until Tuesday when she became nauseous and couldn't tolerate PO diet associated w/ fever. Pt took motrin w/h helped, but then the fever and chills returned and this AM awoke w/ BL flank pain prompting trip to ED.    ED  /64 HR 65 RR 18 96% RA T 99.1  WBC 9.68 Hg 11.5 K 3.4  UA LEC+diana WBC 69 epithelial cells 1  CT abd suggestive of BL pyelitis and L pyelonephritis     Pt given 2L LR, 1g ceftriaxone, admitted to medicine

## 2022-08-18 NOTE — H&P ADULT - NSHPLABSRESULTS_GEN_ALL_CORE
LABS:                        11.5   9.68  )-----------( 228      ( 18 Aug 2022 11:27 )             34.5     08-18    134<L>  |  97<L>  |  14  ----------------------------<  111<H>  3.4<L>   |  25  |  0.8    Ca    8.7      18 Aug 2022 11:55    TPro  5.7<L>  /  Alb  3.6  /  TBili  0.6  /  DBili  x   /  AST  16  /  ALT  10  /  AlkPhos  94        Urinalysis Basic - ( 18 Aug 2022 11:27 )    Color: Light Yellow / Appearance: Clear / S.009 / pH: x  Gluc: x / Ketone: Negative  / Bili: Negative / Urobili: <2 mg/dL   Blood: x / Protein: Trace / Nitrite: Negative   Leuk Esterase: Large / RBC: 5 /HPF / WBC 69 /HPF   Sq Epi: x / Non Sq Epi: 1 /HPF / Bacteria: Moderate    RADIOLOGY:  < from: CT Abdomen and Pelvis w/ IV Cont (22 @ 15:09) >      IMPRESSION:  1.  Findings suggest bilateral pyelitis and left pyelonephritis.  2.  3 cm left ovarian cyst. Follow-up pelvic ultrasound is recommended.  3.  Mild large airways inflammation noted at the lung bases.      < end of copied text >

## 2022-08-18 NOTE — ED PROVIDER NOTE - ATTENDING CONTRIBUTION TO CARE
76-year-old female history of hypertension for evaluation of bilateral back pain for the past 3 days.  Patient also reports having fever at home.  She is no urinary symptoms.  Agree with above exam  impression  Patient here with 3 days of lower back pain bilaterally and fever.  Patient found to have bilateral pyelonephritis.  Patient given antibiotics due to patient's advanced age, fever, and   pyelonephritis will admit.

## 2022-08-19 LAB
-  CTX-M RESISTANCE MARKER: SIGNIFICANT CHANGE UP
-  ESBL: SIGNIFICANT CHANGE UP
A1C WITH ESTIMATED AVERAGE GLUCOSE RESULT: 6.3 % — HIGH (ref 4–5.6)
ANION GAP SERPL CALC-SCNC: 11 MMOL/L — SIGNIFICANT CHANGE UP (ref 7–14)
BUN SERPL-MCNC: 15 MG/DL — SIGNIFICANT CHANGE UP (ref 10–20)
CALCIUM SERPL-MCNC: 8.9 MG/DL — SIGNIFICANT CHANGE UP (ref 8.5–10.1)
CHLORIDE SERPL-SCNC: 98 MMOL/L — SIGNIFICANT CHANGE UP (ref 98–110)
CHOLEST SERPL-MCNC: 116 MG/DL — SIGNIFICANT CHANGE UP
CO2 SERPL-SCNC: 27 MMOL/L — SIGNIFICANT CHANGE UP (ref 17–32)
CREAT SERPL-MCNC: 0.8 MG/DL — SIGNIFICANT CHANGE UP (ref 0.7–1.5)
E COLI DNA BLD POS QL NAA+NON-PROBE: SIGNIFICANT CHANGE UP
EGFR: 75 ML/MIN/1.73M2 — SIGNIFICANT CHANGE UP
ESTIMATED AVERAGE GLUCOSE: 134 MG/DL — HIGH (ref 68–114)
GLUCOSE BLDC GLUCOMTR-MCNC: 114 MG/DL — HIGH (ref 70–99)
GLUCOSE BLDC GLUCOMTR-MCNC: 145 MG/DL — HIGH (ref 70–99)
GLUCOSE BLDC GLUCOMTR-MCNC: 153 MG/DL — HIGH (ref 70–99)
GLUCOSE BLDC GLUCOMTR-MCNC: 167 MG/DL — HIGH (ref 70–99)
GLUCOSE SERPL-MCNC: 112 MG/DL — HIGH (ref 70–99)
GRAM STN FLD: SIGNIFICANT CHANGE UP
HCT VFR BLD CALC: 31.3 % — LOW (ref 37–47)
HDLC SERPL-MCNC: 49 MG/DL — LOW
HGB BLD-MCNC: 10.2 G/DL — LOW (ref 12–16)
LIPID PNL WITH DIRECT LDL SERPL: 50 MG/DL — SIGNIFICANT CHANGE UP
MAGNESIUM SERPL-MCNC: 1.7 MG/DL — LOW (ref 1.8–2.4)
MCHC RBC-ENTMCNC: 25.8 PG — LOW (ref 27–31)
MCHC RBC-ENTMCNC: 32.6 G/DL — SIGNIFICANT CHANGE UP (ref 32–37)
MCV RBC AUTO: 79 FL — LOW (ref 81–99)
METHOD TYPE: SIGNIFICANT CHANGE UP
NON HDL CHOLESTEROL: 67 MG/DL — SIGNIFICANT CHANGE UP
NRBC # BLD: 0 /100 WBCS — SIGNIFICANT CHANGE UP (ref 0–0)
PHOSPHATE SERPL-MCNC: 3.7 MG/DL — SIGNIFICANT CHANGE UP (ref 2.1–4.9)
PLATELET # BLD AUTO: 206 K/UL — SIGNIFICANT CHANGE UP (ref 130–400)
POTASSIUM SERPL-MCNC: 4.4 MMOL/L — SIGNIFICANT CHANGE UP (ref 3.5–5)
POTASSIUM SERPL-SCNC: 4.4 MMOL/L — SIGNIFICANT CHANGE UP (ref 3.5–5)
RBC # BLD: 3.96 M/UL — LOW (ref 4.2–5.4)
RBC # FLD: 15.2 % — HIGH (ref 11.5–14.5)
SODIUM SERPL-SCNC: 136 MMOL/L — SIGNIFICANT CHANGE UP (ref 135–146)
SPECIMEN SOURCE: SIGNIFICANT CHANGE UP
SPECIMEN SOURCE: SIGNIFICANT CHANGE UP
TRIGL SERPL-MCNC: 87 MG/DL — SIGNIFICANT CHANGE UP
WBC # BLD: 8.04 K/UL — SIGNIFICANT CHANGE UP (ref 4.8–10.8)
WBC # FLD AUTO: 8.04 K/UL — SIGNIFICANT CHANGE UP (ref 4.8–10.8)

## 2022-08-19 PROCEDURE — 76830 TRANSVAGINAL US NON-OB: CPT | Mod: 26

## 2022-08-19 PROCEDURE — 99233 SBSQ HOSP IP/OBS HIGH 50: CPT

## 2022-08-19 RX ORDER — MAGNESIUM SULFATE 500 MG/ML
2 VIAL (ML) INJECTION ONCE
Refills: 0 | Status: COMPLETED | OUTPATIENT
Start: 2022-08-19 | End: 2022-08-19

## 2022-08-19 RX ORDER — LIDOCAINE 4 G/100G
1 CREAM TOPICAL DAILY
Refills: 0 | Status: DISCONTINUED | OUTPATIENT
Start: 2022-08-19 | End: 2022-08-24

## 2022-08-19 RX ORDER — IBUPROFEN 200 MG
400 TABLET ORAL ONCE
Refills: 0 | Status: COMPLETED | OUTPATIENT
Start: 2022-08-19 | End: 2022-08-19

## 2022-08-19 RX ADMIN — ATORVASTATIN CALCIUM 20 MILLIGRAM(S): 80 TABLET, FILM COATED ORAL at 22:14

## 2022-08-19 RX ADMIN — Medication 650 MILLIGRAM(S): at 13:25

## 2022-08-19 RX ADMIN — Medication 1 PACKET(S): at 11:12

## 2022-08-19 RX ADMIN — Medication 1: at 17:07

## 2022-08-19 RX ADMIN — LIDOCAINE 1 PATCH: 4 CREAM TOPICAL at 12:45

## 2022-08-19 RX ADMIN — Medication 1000 UNIT(S): at 11:12

## 2022-08-19 RX ADMIN — LIDOCAINE 1 PATCH: 4 CREAM TOPICAL at 19:40

## 2022-08-19 RX ADMIN — LIDOCAINE 1 PATCH: 4 CREAM TOPICAL at 11:13

## 2022-08-19 RX ADMIN — CEFEPIME 100 MILLIGRAM(S): 1 INJECTION, POWDER, FOR SOLUTION INTRAMUSCULAR; INTRAVENOUS at 17:02

## 2022-08-19 RX ADMIN — LIDOCAINE 1 PATCH: 4 CREAM TOPICAL at 19:41

## 2022-08-19 RX ADMIN — Medication 400 MILLIGRAM(S): at 16:48

## 2022-08-19 RX ADMIN — Medication 81 MILLIGRAM(S): at 11:12

## 2022-08-19 RX ADMIN — Medication 1 TABLET(S): at 11:11

## 2022-08-19 RX ADMIN — Medication 600 MILLIGRAM(S): at 00:15

## 2022-08-19 RX ADMIN — Medication 650 MILLIGRAM(S): at 15:44

## 2022-08-19 RX ADMIN — SODIUM CHLORIDE 100 MILLILITER(S): 9 INJECTION, SOLUTION INTRAVENOUS at 06:52

## 2022-08-19 RX ADMIN — CEFEPIME 100 MILLIGRAM(S): 1 INJECTION, POWDER, FOR SOLUTION INTRAMUSCULAR; INTRAVENOUS at 06:17

## 2022-08-19 RX ADMIN — Medication 25 GRAM(S): at 11:12

## 2022-08-19 NOTE — PROGRESS NOTE ADULT - ASSESSMENT
75 yo woman pmhx dm, htn, hld, OA, multiple UTIs presents w/ +diana UA, BL CVA tenderness, was diagnosed with pyelonephritis, gram negative bacteremia.       A/P   #  Complicated UTI/ acute pyelonephritis/ gram negative bacteremia   - c/w IV hydration for now, get kidney and bladder US  - monitor urine output  - repeat blood Cx, f/u urine culture and sensitivity   - no evidence of sepsis on admission   - cefepime 2g q 12  - encourage po hydration   - pt was consulted regarding personal hygiene     # microcytic anemia   - send anemia work up  - monitor H/H, keep Hb above 7.5     # HTN  - c/w  losartan 100 mg Q 24 hours with parameters for BP   - hold off with diuretics     # DM  - carb consistent diet   - monitor finger sticks   - Insulin coverage while in the hospital     # OA  - tylenol/naproxen for pain    # HLD  -  lipid panel noted   - on statin    # Ovarian cyst  - will get transvaginal US       DVT lovenox  Diet dash/tlc cc  GI on  famotidine

## 2022-08-19 NOTE — PROGRESS NOTE ADULT - ASSESSMENT
77 yo woman pmhx dm, htn, hld, OA, multiple UTIs presents w/ +ve UA, BL CVA tenderness and imaging suggestive of pyelonephritis    # acute pyelonephritis  - c/w IV hydration  - f/u BCx, f/u UCx  - c/w cefepime 2g q 12    # microcytic anemia   - Hb 11.5 - baseline  - f/u iron + tibc, retic count, ferritin, haptoglobin, folate, b12, tsh  - monitor H/H, keep Hb > 7.5    # HTN  - pt on losartan-hctz 100/25 at home  - c/w losartan 100  - d/c hctz 25, no diuretics    # DM  - pt only on metformin at home  - A1C 6.3  - ISS for now, up titrate as needed    # hypokalemia  - Mg 1.7 - replete    # chronic OA  - tylenol/naproxen for pain    # HLD  - Cholesterol 116, TG 87, HDL 49, nonHDL 67, LDL 50  - c/w asa/statin    #Ovarian cyst  - f/u US pelvis    DVT lovenox  Diet dash/tlc cc  GI famotidine while inpt

## 2022-08-19 NOTE — PROGRESS NOTE ADULT - SUBJECTIVE AND OBJECTIVE BOX
Hospital day 1. No acute events overnight. She complains of flank pain, frequent urination, and fever. She had fever in the afternoon 101.3-101.9 that did not resolve with tylenol. gave ibuprofen. Denies headache, dizziness, chest pain, sob, palpitations, or abdominal pain.       VITAL SIGNS:  T(C): 38.7 (22 @ 15:17), Max: 39.9 (22 @ 20:55)  HR: 71 (22 @ 12:20) (60 - 99)  BP: 147/67 (22 @ 12:20) (98/45 - 160/60)  RR: 18 (22 @ 12:20) (18 - 18)  SpO2: 95% (22 @ 07:57) (95% - 100%)      PHYSICAL EXAM:  GENERAL: NAD, well-groomed, well-developed  HEAD:  Atraumatic, Normocephalic  NECK: Supple, No JVD  NERVOUS SYSTEM:  Alert & Oriented X3, Good concentration  CHEST/LUNG: Clear to auscultation bilaterally; No rales, rhonchi, wheezing, or rubs  HEART: Regular rate and rhythm. Normal S1/S1. No murmurs, rubs, or gallops  ABDOMEN: B/L CVA tenderness, Soft, Nontender, Nondistended; Bowel sounds present  EXTREMITIES:  2+ Peripheral Pulses, No clubbing, cyanosis, or edema  SKIN: No rashes or lesions      MEDICATIONS:  MEDICATIONS  (STANDING):  aspirin enteric coated 81 milliGRAM(s) Oral daily  atorvastatin 20 milliGRAM(s) Oral at bedtime  cefepime   IVPB      cefepime   IVPB 2000 milliGRAM(s) IV Intermittent every 12 hours  cholecalciferol 1000 Unit(s) Oral daily  dextrose 5%. 1000 milliLiter(s) (100 mL/Hr) IV Continuous <Continuous>  dextrose 5%. 1000 milliLiter(s) (50 mL/Hr) IV Continuous <Continuous>  dextrose 50% Injectable 25 Gram(s) IV Push once  dextrose 50% Injectable 12.5 Gram(s) IV Push once  dextrose 50% Injectable 25 Gram(s) IV Push once  glucagon  Injectable 1 milliGRAM(s) IntraMuscular once  hydrochlorothiazide      hydrochlorothiazide 25 milliGRAM(s) Oral daily  insulin lispro (ADMELOG) corrective regimen sliding scale   SubCutaneous three times a day before meals  lactated ringers. 1000 milliLiter(s) (100 mL/Hr) IV Continuous <Continuous>  lactobacillus acidophilus 1 Tablet(s) Oral daily  lidocaine   4% Patch 1 Patch Transdermal daily  lidocaine   4% Patch 1 Patch Transdermal daily  losartan 100 milliGRAM(s) Oral daily  losartan      potassium chloride    Tablet ER 40 milliEquivalent(s) Oral once  psyllium Powder 1 Packet(s) Oral daily    MEDICATIONS  (PRN):  acetaminophen     Tablet .. 650 milliGRAM(s) Oral every 6 hours PRN Temp greater or equal to 38C (100.4F), Mild Pain (1 - 3)  aluminum hydroxide/magnesium hydroxide/simethicone Suspension 30 milliLiter(s) Oral every 4 hours PRN Dyspepsia  dextrose Oral Gel 15 Gram(s) Oral once PRN Blood Glucose LESS THAN 70 milliGRAM(s)/deciliter  melatonin 3 milliGRAM(s) Oral at bedtime PRN Insomnia  naproxen 250 milliGRAM(s) Oral two times a day PRN Mild Pain (1 - 3)  ondansetron Injectable 4 milliGRAM(s) IV Push every 8 hours PRN Nausea and/or Vomiting      LABS:                        10.2   8.04  )-----------( 206      ( 19 Aug 2022 08:21 )             31.3     08-    136  |  98  |  15  ----------------------------<  112<H>  4.4   |  27  |  0.8    Ca    8.9      19 Aug 2022 08:21  Phos  3.7     -  Mg     1.7     -    TPro  5.7<L>  /  Alb  3.6  /  TBili  0.6  /  DBili  x   /  AST  16  /  ALT  10  /  AlkPhos  94  08-18      Urinalysis Basic - ( 18 Aug 2022 11:27 )    Color: Light Yellow / Appearance: Clear / S.009 / pH: x  Gluc: x / Ketone: Negative  / Bili: Negative / Urobili: <2 mg/dL   Blood: x / Protein: Trace / Nitrite: Negative   Leuk Esterase: Large / RBC: 5 /HPF / WBC 69 /HPF   Sq Epi: x / Non Sq Epi: 1 /HPF / Bacteria: Moderate

## 2022-08-19 NOTE — PROGRESS NOTE ADULT - SUBJECTIVE AND OBJECTIVE BOX
Patient is a 76y old  Female who presents with a chief complaint of flank pain, was admitted for complicated UTI/pyelonephritis, was found to have gram negative bacteremia.   Today pt is c/o b/l flank pain and frequent urination, denies fever, chills.     Vital Signs Last 24 Hrs  T(C): 38.7 (19 Aug 2022 15:17), Max: 39.9 (18 Aug 2022 20:55)  T(F): 101.7 (19 Aug 2022 15:17), Max: 103.8 (18 Aug 2022 20:55)  HR: 71 (19 Aug 2022 12:20) (60 - 99)  BP: 147/67 (19 Aug 2022 12:20) (98/45 - 160/60)  BP(mean): --  RR: 18 (19 Aug 2022 12:20) (18 - 18)  SpO2: 95% (19 Aug 2022 07:57) (95% - 100%)    Parameters below as of 19 Aug 2022 07:57  Patient On (Oxygen Delivery Method): room air      PHYSICAL EXAM:  GENERAL: NAD, well-groomed, well-developed  HEAD:  Atraumatic, Normocephalic  EYES: EOMI, PERRLA, conjunctiva and sclera clear  ENMT: No tonsillar erythema, exudates, or enlargement; Moist mucous membranes, Good dentition, No lesions  NECK: Supple, No JVD, Normal thyroid  NERVOUS SYSTEM:  Alert & Oriented X3, Good concentration; Motor Strength 5/5 B/L upper and lower extremities; DTRs 2+ intact and symmetric  CHEST/LUNG: Clear to percussion bilaterally; No rales, rhonchi, wheezing, or rubs  HEART: Regular rate and rhythm; No murmurs, rubs, or gallops  ABDOMEN: Soft, Nontender, Nondistended; Bowel sounds present, b/l flank tenderness   EXTREMITIES:  2+ Peripheral Pulses, No clubbing, cyanosis, or edema  LYMPH: No lymphadenopathy noted  SKIN: No rashes or lesions      LABS:                        10.2   8.04  )-----------( 206      ( 19 Aug 2022 08:21 )             31.3     08-19    136  |  98  |  15  ----------------------------<  112<H>  4.4   |  27  |  0.8    Ca    8.9      19 Aug 2022 08:21  Phos  3.7     08-19  Mg     1.7     08-19    TPro  5.7<L>  /  Alb  3.6  /  TBili  0.6  /  DBili  x   /  AST  16  /  ALT  10  /  AlkPhos  94  08-18      Urinalysis Basic - ( 18 Aug 2022 11:27 )    Color: Light Yellow / Appearance: Clear / S.009 / pH: x  Gluc: x / Ketone: Negative  / Bili: Negative / Urobili: <2 mg/dL   Blood: x / Protein: Trace / Nitrite: Negative   Leuk Esterase: Large / RBC: 5 /HPF / WBC 69 /HPF   Sq Epi: x / Non Sq Epi: 1 /HPF / Bacteria: Moderate        Culture - Blood (collected 18 Aug 2022 17:29)  Source: .Blood Blood  Gram Stain (19 Aug 2022 11:05):    Growth in anaerobic bottle: Gram Negative Rods    Growth in aerobic bottle: Gram Negative Rods  Preliminary Report (19 Aug 2022 11:05):    Growth in anaerobic bottle: Gram Negative Rods    Growth in aerobic bottle: Gram Negative Rods    ***Blood Panel PCR results on this specimen are available    approximately 3 hours after the Gram stain result.***    Gram stain, PCR, and/or culture results may not always    correspond due to difference in methodologies.    ************************************************************    This PCR assay was performed by multiplex PCR. This    Assay tests for 66 bacterial and resistance gene targets.    Please refer to the [facility] test directory    at [email or web address] for details.  Organism: Blood Culture PCR (19 Aug 2022 12:42)  Organism: Blood Culture PCR (19 Aug 2022 12:42)    Culture - Blood (collected 18 Aug 2022 17:29)  Source: .Blood Blood  Gram Stain (19 Aug 2022 13:11):    Growth in aerobic bottle: Gram Negative Rods    Growth in anaerobic bottle: Gram Negative Rods  Preliminary Report (19 Aug 2022 13:11):    Growth in aerobic bottle: Gram Negative Rods    Growth in anaerobic bottle: Gram Negative Rods      RADIOLOGY & ADDITIONAL TESTS:    < from: CT Abdomen and Pelvis w/ IV Cont (22 @ 15:09) >  IMPRESSION:  1.  Findings suggest bilateral pyelitis and left pyelonephritis.  2.  3 cm left ovarian cyst. Follow-up pelvic ultrasound is recommended.  3.  Mild large airways inflammation noted at the lung bases.    < end of copied text >    MEDICATIONS  (STANDING):  aspirin enteric coated 81 milliGRAM(s) Oral daily  atorvastatin 20 milliGRAM(s) Oral at bedtime  cefepime   IVPB      cefepime   IVPB 2000 milliGRAM(s) IV Intermittent every 12 hours  cholecalciferol 1000 Unit(s) Oral daily  dextrose 5%. 1000 milliLiter(s) (100 mL/Hr) IV Continuous <Continuous>  dextrose 5%. 1000 milliLiter(s) (50 mL/Hr) IV Continuous <Continuous>  dextrose 50% Injectable 25 Gram(s) IV Push once  dextrose 50% Injectable 12.5 Gram(s) IV Push once  dextrose 50% Injectable 25 Gram(s) IV Push once  glucagon  Injectable 1 milliGRAM(s) IntraMuscular once  hydrochlorothiazide      hydrochlorothiazide 25 milliGRAM(s) Oral daily  ibuprofen  Tablet. 400 milliGRAM(s) Oral once  insulin lispro (ADMELOG) corrective regimen sliding scale   SubCutaneous three times a day before meals  lactated ringers. 1000 milliLiter(s) (100 mL/Hr) IV Continuous <Continuous>  lactobacillus acidophilus 1 Tablet(s) Oral daily  lidocaine   4% Patch 1 Patch Transdermal daily  lidocaine   4% Patch 1 Patch Transdermal daily  losartan 100 milliGRAM(s) Oral daily  losartan      potassium chloride    Tablet ER 40 milliEquivalent(s) Oral once  psyllium Powder 1 Packet(s) Oral daily    MEDICATIONS  (PRN):  acetaminophen     Tablet .. 650 milliGRAM(s) Oral every 6 hours PRN Temp greater or equal to 38C (100.4F), Mild Pain (1 - 3)  aluminum hydroxide/magnesium hydroxide/simethicone Suspension 30 milliLiter(s) Oral every 4 hours PRN Dyspepsia  dextrose Oral Gel 15 Gram(s) Oral once PRN Blood Glucose LESS THAN 70 milliGRAM(s)/deciliter  melatonin 3 milliGRAM(s) Oral at bedtime PRN Insomnia  naproxen 250 milliGRAM(s) Oral two times a day PRN Mild Pain (1 - 3)  ondansetron Injectable 4 milliGRAM(s) IV Push every 8 hours PRN Nausea and/or Vomiting

## 2022-08-20 LAB
ALBUMIN SERPL ELPH-MCNC: 3.4 G/DL — LOW (ref 3.5–5.2)
ALP SERPL-CCNC: 162 U/L — HIGH (ref 30–115)
ALT FLD-CCNC: 43 U/L — HIGH (ref 0–41)
ANION GAP SERPL CALC-SCNC: 13 MMOL/L — SIGNIFICANT CHANGE UP (ref 7–14)
AST SERPL-CCNC: 61 U/L — HIGH (ref 0–41)
BASOPHILS # BLD AUTO: 0.02 K/UL — SIGNIFICANT CHANGE UP (ref 0–0.2)
BASOPHILS NFR BLD AUTO: 0.3 % — SIGNIFICANT CHANGE UP (ref 0–1)
BILIRUB SERPL-MCNC: 0.4 MG/DL — SIGNIFICANT CHANGE UP (ref 0.2–1.2)
BUN SERPL-MCNC: 11 MG/DL — SIGNIFICANT CHANGE UP (ref 10–20)
CALCIUM SERPL-MCNC: 8.8 MG/DL — SIGNIFICANT CHANGE UP (ref 8.5–10.1)
CHLORIDE SERPL-SCNC: 100 MMOL/L — SIGNIFICANT CHANGE UP (ref 98–110)
CO2 SERPL-SCNC: 26 MMOL/L — SIGNIFICANT CHANGE UP (ref 17–32)
CREAT SERPL-MCNC: 0.6 MG/DL — LOW (ref 0.7–1.5)
CULTURE RESULTS: NO GROWTH — SIGNIFICANT CHANGE UP
EGFR: 92 ML/MIN/1.73M2 — SIGNIFICANT CHANGE UP
EOSINOPHIL # BLD AUTO: 0.13 K/UL — SIGNIFICANT CHANGE UP (ref 0–0.7)
EOSINOPHIL NFR BLD AUTO: 2 % — SIGNIFICANT CHANGE UP (ref 0–8)
FERRITIN SERPL-MCNC: 382 NG/ML — HIGH (ref 15–150)
FOLATE SERPL-MCNC: 9.3 NG/ML — SIGNIFICANT CHANGE UP
GLUCOSE BLDC GLUCOMTR-MCNC: 112 MG/DL — HIGH (ref 70–99)
GLUCOSE BLDC GLUCOMTR-MCNC: 113 MG/DL — HIGH (ref 70–99)
GLUCOSE BLDC GLUCOMTR-MCNC: 177 MG/DL — HIGH (ref 70–99)
GLUCOSE BLDC GLUCOMTR-MCNC: 95 MG/DL — SIGNIFICANT CHANGE UP (ref 70–99)
GLUCOSE SERPL-MCNC: 99 MG/DL — SIGNIFICANT CHANGE UP (ref 70–99)
HAPTOGLOB SERPL-MCNC: 358 MG/DL — HIGH (ref 34–200)
HCT VFR BLD CALC: 30.8 % — LOW (ref 37–47)
HGB BLD-MCNC: 10.3 G/DL — LOW (ref 12–16)
IMM GRANULOCYTES NFR BLD AUTO: 0.3 % — SIGNIFICANT CHANGE UP (ref 0.1–0.3)
IRON SATN MFR SERPL: 17 UG/DL — LOW (ref 35–150)
IRON SATN MFR SERPL: 9 % — LOW (ref 15–50)
LYMPHOCYTES # BLD AUTO: 1.06 K/UL — LOW (ref 1.2–3.4)
LYMPHOCYTES # BLD AUTO: 16.7 % — LOW (ref 20.5–51.1)
MAGNESIUM SERPL-MCNC: 1.9 MG/DL — SIGNIFICANT CHANGE UP (ref 1.8–2.4)
MCHC RBC-ENTMCNC: 25.9 PG — LOW (ref 27–31)
MCHC RBC-ENTMCNC: 33.4 G/DL — SIGNIFICANT CHANGE UP (ref 32–37)
MCV RBC AUTO: 77.4 FL — LOW (ref 81–99)
MONOCYTES # BLD AUTO: 0.69 K/UL — HIGH (ref 0.1–0.6)
MONOCYTES NFR BLD AUTO: 10.8 % — HIGH (ref 1.7–9.3)
NEUTROPHILS # BLD AUTO: 4.44 K/UL — SIGNIFICANT CHANGE UP (ref 1.4–6.5)
NEUTROPHILS NFR BLD AUTO: 69.9 % — SIGNIFICANT CHANGE UP (ref 42.2–75.2)
NRBC # BLD: 0 /100 WBCS — SIGNIFICANT CHANGE UP (ref 0–0)
PLATELET # BLD AUTO: 218 K/UL — SIGNIFICANT CHANGE UP (ref 130–400)
POTASSIUM SERPL-MCNC: 4.2 MMOL/L — SIGNIFICANT CHANGE UP (ref 3.5–5)
POTASSIUM SERPL-SCNC: 4.2 MMOL/L — SIGNIFICANT CHANGE UP (ref 3.5–5)
PROT SERPL-MCNC: 5.8 G/DL — LOW (ref 6–8)
RBC # BLD: 3.98 M/UL — LOW (ref 4.2–5.4)
RBC # BLD: 3.98 M/UL — LOW (ref 4.2–5.4)
RBC # FLD: 15 % — HIGH (ref 11.5–14.5)
RETICS #: 34.6 K/UL — SIGNIFICANT CHANGE UP (ref 25–125)
RETICS/RBC NFR: 0.9 % — SIGNIFICANT CHANGE UP (ref 0.5–1.5)
SODIUM SERPL-SCNC: 139 MMOL/L — SIGNIFICANT CHANGE UP (ref 135–146)
SPECIMEN SOURCE: SIGNIFICANT CHANGE UP
TIBC SERPL-MCNC: 199 UG/DL — LOW (ref 220–430)
TSH SERPL-MCNC: 2.95 UIU/ML — SIGNIFICANT CHANGE UP (ref 0.27–4.2)
UIBC SERPL-MCNC: 182 UG/DL — SIGNIFICANT CHANGE UP (ref 110–370)
VIT B12 SERPL-MCNC: 418 PG/ML — SIGNIFICANT CHANGE UP (ref 232–1245)
WBC # BLD: 6.36 K/UL — SIGNIFICANT CHANGE UP (ref 4.8–10.8)
WBC # FLD AUTO: 6.36 K/UL — SIGNIFICANT CHANGE UP (ref 4.8–10.8)

## 2022-08-20 PROCEDURE — 99233 SBSQ HOSP IP/OBS HIGH 50: CPT

## 2022-08-20 PROCEDURE — 76770 US EXAM ABDO BACK WALL COMP: CPT | Mod: 26

## 2022-08-20 RX ORDER — IRON SUCROSE 20 MG/ML
200 INJECTION, SOLUTION INTRAVENOUS EVERY 24 HOURS
Refills: 0 | Status: DISCONTINUED | OUTPATIENT
Start: 2022-08-20 | End: 2022-08-20

## 2022-08-20 RX ORDER — IRON SUCROSE 20 MG/ML
200 INJECTION, SOLUTION INTRAVENOUS EVERY 24 HOURS
Refills: 0 | Status: COMPLETED | OUTPATIENT
Start: 2022-08-20 | End: 2022-08-22

## 2022-08-20 RX ADMIN — Medication 1 TABLET(S): at 11:48

## 2022-08-20 RX ADMIN — LIDOCAINE 1 PATCH: 4 CREAM TOPICAL at 19:00

## 2022-08-20 RX ADMIN — SODIUM CHLORIDE 100 MILLILITER(S): 9 INJECTION, SOLUTION INTRAVENOUS at 06:17

## 2022-08-20 RX ADMIN — CEFEPIME 100 MILLIGRAM(S): 1 INJECTION, POWDER, FOR SOLUTION INTRAMUSCULAR; INTRAVENOUS at 05:39

## 2022-08-20 RX ADMIN — CEFEPIME 100 MILLIGRAM(S): 1 INJECTION, POWDER, FOR SOLUTION INTRAMUSCULAR; INTRAVENOUS at 17:25

## 2022-08-20 RX ADMIN — LIDOCAINE 1 PATCH: 4 CREAM TOPICAL at 11:50

## 2022-08-20 RX ADMIN — Medication 650 MILLIGRAM(S): at 15:05

## 2022-08-20 RX ADMIN — Medication 1000 UNIT(S): at 11:48

## 2022-08-20 RX ADMIN — ATORVASTATIN CALCIUM 20 MILLIGRAM(S): 80 TABLET, FILM COATED ORAL at 21:31

## 2022-08-20 RX ADMIN — Medication 1: at 17:25

## 2022-08-20 RX ADMIN — SODIUM CHLORIDE 100 MILLILITER(S): 9 INJECTION, SOLUTION INTRAVENOUS at 17:38

## 2022-08-20 RX ADMIN — LIDOCAINE 1 PATCH: 4 CREAM TOPICAL at 23:30

## 2022-08-20 RX ADMIN — LOSARTAN POTASSIUM 100 MILLIGRAM(S): 100 TABLET, FILM COATED ORAL at 05:39

## 2022-08-20 RX ADMIN — Medication 1 PACKET(S): at 11:49

## 2022-08-20 RX ADMIN — LIDOCAINE 1 PATCH: 4 CREAM TOPICAL at 01:44

## 2022-08-20 RX ADMIN — IRON SUCROSE 110 MILLIGRAM(S): 20 INJECTION, SOLUTION INTRAVENOUS at 17:37

## 2022-08-20 RX ADMIN — Medication 81 MILLIGRAM(S): at 11:49

## 2022-08-20 RX ADMIN — Medication 650 MILLIGRAM(S): at 14:18

## 2022-08-20 RX ADMIN — LIDOCAINE 1 PATCH: 4 CREAM TOPICAL at 00:44

## 2022-08-20 NOTE — PROGRESS NOTE ADULT - ASSESSMENT
77 yo woman pmhx dm, htn, hld, OA, multiple UTIs presents w/ +ve UA, BL CVA tenderness and imaging suggestive of pyelonephritis    # acute pyelonephritis  - c/w IV hydration  - f/u BCx gram negative rods, UCx normal urogenital oleksandr  - c/w cefepime 2g q 12  - US kidney and bladder - performed, pending read    # microcytic anemia   - Hb 11.5 - baseline  - f/u iron + tibc, retic count, ferritin, haptoglobin, folate, b12, tsh  - monitor H/H, keep Hb > 7.5    # HTN  - c/w losartan 100  - d/c hctz 25, no diuretics    # DM  - pt only on metformin at home  - A1C 6.3  - ISS for now, up titrate as needed    # hypokalemia  - 8/19 Mg 1.7 - replete  - monitor Mg, replete as needed    # chronic OA  - tylenol/naproxen for pain    # HLD  - Cholesterol 116, TG 87, HDL 49, nonHDL 67, LDL 50  - c/w asa/statin    #Ovarian cyst  - US pelvis - performed, pending read    DVT lovenox  Diet dash/tlc cc  GI famotidine while inpt   77 yo woman pmhx dm, htn, hld, OA, multiple UTIs presents w/ +ve UA, BL CVA tenderness and imaging suggestive of pyelonephritis    # acute pyelonephritis  - c/w IV hydration  - f/u BCx gram negative rods, UCx normal urogenital oleksandr  - c/w cefepime 2g q 12  - US kidney and bladder - performed, pending read    # microcytic anemia   - Hb 11.5 - baseline  - Iron 17, TIBC 199, retic count 3.98,   - ferritin, haptoglobin, folate, b12, tsh  - monitor H/H, keep Hb > 7.5    # HTN  - c/w losartan 100  - d/c hctz 25, no diuretics    # DM  - pt only on metformin at home  - A1C 6.3  - ISS for now, up titrate as needed    # hypokalemia  - 8/19 Mg 1.7 - replete  - monitor Mg, replete as needed    # chronic OA  - tylenol/naproxen for pain    # HLD  - Cholesterol 116, TG 87, HDL 49, nonHDL 67, LDL 50  - c/w asa/statin    #Ovarian cyst  - US pelvis - thickened endometrium 1.6cm, f/u OP MRI. L simple cyst 2.7cm, f/u US in 1yr     DVT lovenox  Diet dash/tlc cc  GI famotidine while inpt   77 yo woman pmhx dm, htn, hld, OA, multiple UTIs presents w/ +ve UA, BL CVA tenderness and imaging suggestive of pyelonephritis    # acute pyelonephritis  - c/w IV hydration  - f/u BCx gram negative rods, UCx normal urogenital oleksandr  - c/w cefepime 2g q 12  - US kidney and bladder - kidneys unremarkable, bladder not evaluated since pt voided prior to exam    # microcytic anemia   - Hb 11.5 - baseline  - Iron 17, TIBC 199, retic count 3.98,   - ferritin, haptoglobin, folate, b12, tsh  - monitor H/H, keep Hb > 7.5    # HTN  - c/w losartan 100  - d/c hctz 25, no diuretics    # DM  - pt only on metformin at home  - A1C 6.3  - ISS for now, up titrate as needed    # hypokalemia  - 8/19 Mg 1.7 - replete  - monitor Mg, replete as needed    # chronic OA  - tylenol/naproxen for pain    # HLD  - Cholesterol 116, TG 87, HDL 49, nonHDL 67, LDL 50  - c/w asa/statin    #Ovarian cyst  - US pelvis - thickened endometrium 1.6cm, f/u OP MRI. L simple cyst 2.7cm, f/u US in 1yr     DVT lovenox  Diet dash/tlc cc  GI famotidine while inpt   75 yo woman pmhx dm, htn, hld, OA, multiple UTIs presents w/ +ve UA, BL CVA tenderness and imaging suggestive of pyelonephritis    # acute pyelonephritis  - c/w IV hydration  - BCx: E. coli, UCx E. coli  - c/w cefepime 2g q 12  - US kidney and bladder - kidneys unremarkable, bladder not evaluated since pt voided prior to exam    # microcytic anemia   - Hb 11.5 - baseline  - Iron 17, TIBC 199, retic count 3.98,   - ferritin, haptoglobin, folate, b12, tsh  - monitor H/H, keep Hb > 7.5    # HTN  - c/w losartan 100  - d/c hctz 25, no diuretics    # DM  - pt only on metformin at home  - A1C 6.3  - ISS for now, up titrate as needed    # hypokalemia  - 8/19 Mg 1.7 - replete  - monitor Mg, replete as needed    # chronic OA  - tylenol/naproxen for pain    # HLD  - Cholesterol 116, TG 87, HDL 49, nonHDL 67, LDL 50  - c/w asa/statin    #Ovarian cyst  - US pelvis - thickened endometrium 1.6cm, f/u OP MRI. L simple cyst 2.7cm, f/u US in 1yr     DVT lovenox  Diet dash/tlc cc  GI famotidine while inpt   77 yo woman pmhx dm, htn, hld, OA, multiple UTIs presents w/ +ve UA, BL CVA tenderness and imaging suggestive of pyelonephritis    # acute pyelonephritis  - c/w IV hydration  - BCx: E. coli, UCx E. coli  - c/w cefepime 2g q 12  - US kidney and bladder - kidneys unremarkable, bladder not evaluated since pt voided prior to exam    # microcytic anemia   - Hb 11.5 - baseline  - Iron 17, TIBC 199, retic count 3.98, tsh 2.95  - ferritin, haptoglobin, folate, b12  - monitor H/H, keep Hb > 7.5    # HTN  - c/w losartan 100  - d/c hctz 25, no diuretics    # DM  - pt only on metformin at home  - A1C 6.3  - ISS for now, up titrate as needed    # hypokalemia  - 8/19 Mg 1.7 - replete  - monitor Mg, replete as needed    # chronic OA  - tylenol/naproxen for pain    # HLD  - Cholesterol 116, TG 87, HDL 49, nonHDL 67, LDL 50  - c/w asa/statin    #Ovarian cyst  - US pelvis - thickened endometrium 1.6cm, f/u OP MRI. L simple cyst 2.7cm, f/u US in 1yr     DVT lovenox  Diet dash/tlc cc  GI famotidine while inpt   75 yo woman pmhx dm, htn, hld, OA, multiple UTIs presents w/ +ve UA, BL CVA tenderness and imaging suggestive of pyelonephritis    # acute pyelonephritis, gram negative bacteremia  - c/w IV hydration  - BCx: E. coli, UCx E. coli  - c/w cefepime 2g q 12  - US kidney and bladder - kidneys unremarkable, bladder not evaluated since pt voided prior to exam    # microcytic anemia   - Hb 11.5 - baseline  - Iron 17, TIBC 199, retic count 3.98, tsh 2.95  - ferritin, haptoglobin, folate, b12  - started venofer 200mg q24 for 3days (8/20-23)  - monitor H/H, keep Hb > 7.5    # HTN  - c/w losartan 100  - d/c hctz 25, no diuretics    # DM  - pt only on metformin at home  - A1C 6.3  - ISS for now, up titrate as needed    # hypokalemia  - 8/19 Mg 1.7 - replete  - monitor Mg, replete as needed    # chronic OA  - tylenol/naproxen for pain    # HLD  - Cholesterol 116, TG 87, HDL 49, nonHDL 67, LDL 50  - c/w asa/statin    #Ovarian cyst  - US pelvis - thickened endometrium 1.6cm, f/u OP MRI. L simple cyst 2.7cm, f/u US in 1yr     DVT lovenox  Diet dash/tlc cc  GI famotidine while inpt

## 2022-08-20 NOTE — PROGRESS NOTE ADULT - SUBJECTIVE AND OBJECTIVE BOX
Patient is a 76y old  Female who presents with a chief complaint of flank pain, was admitted for complicated UTI/pyelonephritis, was found to have gram negative bacteremia.   Today pt is c/o b/l flank pain, otherwise feels better, spiking fever at night.     Vital Signs Last 24 Hrs  T(C): 37.2 (20 Aug 2022 05:10), Max: 38.8 (19 Aug 2022 14:25)  T(F): 99 (20 Aug 2022 05:10), Max: 101.9 (19 Aug 2022 14:25)  HR: 71 (20 Aug 2022 05:10) (71 - 71)  BP: 150/69 (20 Aug 2022 05:10) (147/67 - 150/69)  BP(mean): --  RR: 18 (20 Aug 2022 05:10) (18 - 18)  SpO2: 95% (20 Aug 2022 07:28) (95% - 95%)    Parameters below as of 20 Aug 2022 07:28  Patient On (Oxygen Delivery Method): room air          PHYSICAL EXAM:  GENERAL: NAD, well-groomed, well-developed  HEAD:  Atraumatic, Normocephalic  EYES: EOMI, PERRLA, conjunctiva and sclera clear  ENMT: No tonsillar erythema, exudates, or enlargement; Moist mucous membranes, Good dentition, No lesions  NECK: Supple, No JVD, Normal thyroid  NERVOUS SYSTEM:  Alert & Oriented X3, Good concentration; Motor Strength 5/5 B/L upper and lower extremities; DTRs 2+ intact and symmetric  CHEST/LUNG: Clear to percussion bilaterally; No rales, rhonchi, wheezing, or rubs  HEART: Regular rate and rhythm; No murmurs, rubs, or gallops  ABDOMEN: Soft, Nontender, Nondistended; Bowel sounds present, b/l flank tenderness   EXTREMITIES:  2+ Peripheral Pulses, No clubbing, cyanosis, or edema  LYMPH: No lymphadenopathy noted  SKIN: No rashes or lesions      LABS:                                   10.3   6.36  )-----------( 218      ( 20 Aug 2022 07:42 )             30.8   08-20    139  |  100  |  11  ----------------------------<  99  4.2   |  26  |  0.6<L>    Ca    8.8      20 Aug 2022 07:42  Phos  3.7     08-  Mg     1.9     -20    TPro  5.8<L>  /  Alb  3.4<L>  /  TBili  0.4  /  DBili  x   /  AST  61<H>  /  ALT  43<H>  /  AlkPhos  162<H>  08-    iroIron Total, Serum: 17 ug/dL (. @ 07:42)     `  Urinalysis Basic - ( 18 Aug 2022 11:27 )    Color: Light Yellow / Appearance: Clear / S.009 / pH: x  Gluc: x / Ketone: Negative  / Bili: Negative / Urobili: <2 mg/dL   Blood: x / Protein: Trace / Nitrite: Negative   Leuk Esterase: Large / RBC: 5 /HPF / WBC 69 /HPF   Sq Epi: x / Non Sq Epi: 1 /HPF / Bacteria: Moderate        Culture - Blood (collected 18 Aug 2022 17:29)  Source: .Blood Blood  Gram Stain (19 Aug 2022 11:05):    Growth in anaerobic bottle: Gram Negative Rods    Growth in aerobic bottle: Gram Negative Rods  Preliminary Report (19 Aug 2022 11:05):    Growth in anaerobic bottle: Gram Negative Rods    Growth in aerobic bottle: Gram Negative Rods    ***Blood Panel PCR results on this specimen are available    approximately 3 hours after the Gram stain result.***    Gram stain, PCR, and/or culture results may not always    correspond due to difference in methodologies.    ************************************************************    This PCR assay was performed by multiplex PCR. This    Assay tests for 66 bacterial and resistance gene targets.    Please refer to the [facility] test directory    at [email or web address] for details.  Organism: Blood Culture PCR (19 Aug 2022 12:42)  Organism: Blood Culture PCR (19 Aug 2022 12:42)    Culture - Blood (collected 18 Aug 2022 17:29)  Source: .Blood Blood  Gram Stain (19 Aug 2022 13:11):    Growth in aerobic bottle: Gram Negative Rods    Growth in anaerobic bottle: Gram Negative Rods  Preliminary Report (19 Aug 2022 13:11):    Growth in aerobic bottle: Gram Negative Rods    Growth in anaerobic bottle: Gram Negative Rods      RADIOLOGY & ADDITIONAL TESTS:    < from: CT Abdomen and Pelvis w/ IV Cont (22 @ 15:09) >  IMPRESSION:  1.  Findings suggest bilateral pyelitis and left pyelonephritis.  2.  3 cm left ovarian cyst. Follow-up pelvic ultrasound is recommended.  3.  Mild large airways inflammation noted at the lung bases.    < end of copied text >    < from: US Kidney and Bladder (22 @ 10:00) >  IMPRESSION:  1.  Unremarkable examination of bilateral kidneys.  2.  Urinary bladder not evaluated, as the patient has voided prior to the   examination.    < end of copied text >    MEDICATIONS  (STANDING):  aspirin enteric coated 81 milliGRAM(s) Oral daily  atorvastatin 20 milliGRAM(s) Oral at bedtime  cefepime   IVPB      cefepime   IVPB 2000 milliGRAM(s) IV Intermittent every 12 hours  cholecalciferol 1000 Unit(s) Oral daily  dextrose 5%. 1000 milliLiter(s) (100 mL/Hr) IV Continuous <Continuous>  dextrose 5%. 1000 milliLiter(s) (50 mL/Hr) IV Continuous <Continuous>  dextrose 50% Injectable 25 Gram(s) IV Push once  dextrose 50% Injectable 12.5 Gram(s) IV Push once  dextrose 50% Injectable 25 Gram(s) IV Push once  glucagon  Injectable 1 milliGRAM(s) IntraMuscular once  insulin lispro (ADMELOG) corrective regimen sliding scale   SubCutaneous three times a day before meals  iron sucrose Injectable 200 milliGRAM(s) IV Push every 24 hours  lactated ringers. 1000 milliLiter(s) (100 mL/Hr) IV Continuous <Continuous>  lactobacillus acidophilus 1 Tablet(s) Oral daily  lidocaine   4% Patch 1 Patch Transdermal daily  lidocaine   4% Patch 1 Patch Transdermal daily  losartan 100 milliGRAM(s) Oral daily  losartan      potassium chloride    Tablet ER 40 milliEquivalent(s) Oral once  psyllium Powder 1 Packet(s) Oral daily    MEDICATIONS  (PRN):  acetaminophen     Tablet .. 650 milliGRAM(s) Oral every 6 hours PRN Temp greater or equal to 38C (100.4F), Mild Pain (1 - 3)  aluminum hydroxide/magnesium hydroxide/simethicone Suspension 30 milliLiter(s) Oral every 4 hours PRN Dyspepsia  dextrose Oral Gel 15 Gram(s) Oral once PRN Blood Glucose LESS THAN 70 milliGRAM(s)/deciliter  melatonin 3 milliGRAM(s) Oral at bedtime PRN Insomnia  naproxen 250 milliGRAM(s) Oral two times a day PRN Mild Pain (1 - 3)  ondansetron Injectable 4 milliGRAM(s) IV Push every 8 hours PRN Nausea and/or Vomiting

## 2022-08-20 NOTE — PROGRESS NOTE ADULT - SUBJECTIVE AND OBJECTIVE BOX
Hospital day 2. No acute events overnight. Yesterday had a fever in the afteroon Tmax 101.9. Did not resolve with tylenol, resolved with motrin. This AM T was 99. She still complains of flank pain, urinary frequency, and "hot" urine. She denies headache, dizziness, chills, chest pain, sob, palpitations, and abdominal pain.       VITAL SIGNS:  T(C): 37.2 (22 @ 05:10), Max: 38.8 (22 @ 14:25)  HR: 71 (22 @ 05:10) (71 - 71)  BP: 150/69 (22 @ 05:10) (147/67 - 150/69)  RR: 18 (22 @ 05:10) (18 - 18)  SpO2: 95% (22 @ 07:28) (95% - 95%)      PHYSICAL EXAM:  GENERAL: NAD, well-groomed, well-developed  HEAD:  Atraumatic, Normocephalic  NECK: Supple  NERVOUS SYSTEM:  Alert & Oriented X3, Good concentration  CHEST/LUNG: Clear to auscultation bilaterally; No rales, rhonchi, wheezing, or rubs  HEART: Regular rate and rhythm. Normal S1/S1. No murmurs, rubs, or gallops  ABDOMEN: B/L CVA tenderness, Soft, Nontender, Nondistended; Bowel sounds present  EXTREMITIES:  2+ Peripheral Pulses, No clubbing, cyanosis, or edema  SKIN: No rashes or lesions      MEDICATIONS:  MEDICATIONS  (STANDING):  aspirin enteric coated 81 milliGRAM(s) Oral daily  atorvastatin 20 milliGRAM(s) Oral at bedtime  cefepime   IVPB      cefepime   IVPB 2000 milliGRAM(s) IV Intermittent every 12 hours  cholecalciferol 1000 Unit(s) Oral daily  dextrose 5%. 1000 milliLiter(s) (100 mL/Hr) IV Continuous <Continuous>  dextrose 5%. 1000 milliLiter(s) (50 mL/Hr) IV Continuous <Continuous>  dextrose 50% Injectable 25 Gram(s) IV Push once  dextrose 50% Injectable 12.5 Gram(s) IV Push once  dextrose 50% Injectable 25 Gram(s) IV Push once  glucagon  Injectable 1 milliGRAM(s) IntraMuscular once  insulin lispro (ADMELOG) corrective regimen sliding scale   SubCutaneous three times a day before meals  lactated ringers. 1000 milliLiter(s) (100 mL/Hr) IV Continuous <Continuous>  lactobacillus acidophilus 1 Tablet(s) Oral daily  lidocaine   4% Patch 1 Patch Transdermal daily  lidocaine   4% Patch 1 Patch Transdermal daily  losartan 100 milliGRAM(s) Oral daily  losartan      potassium chloride    Tablet ER 40 milliEquivalent(s) Oral once  psyllium Powder 1 Packet(s) Oral daily    MEDICATIONS  (PRN):  acetaminophen     Tablet .. 650 milliGRAM(s) Oral every 6 hours PRN Temp greater or equal to 38C (100.4F), Mild Pain (1 - 3)  aluminum hydroxide/magnesium hydroxide/simethicone Suspension 30 milliLiter(s) Oral every 4 hours PRN Dyspepsia  dextrose Oral Gel 15 Gram(s) Oral once PRN Blood Glucose LESS THAN 70 milliGRAM(s)/deciliter  melatonin 3 milliGRAM(s) Oral at bedtime PRN Insomnia  naproxen 250 milliGRAM(s) Oral two times a day PRN Mild Pain (1 - 3)  ondansetron Injectable 4 milliGRAM(s) IV Push every 8 hours PRN Nausea and/or Vomiting      LABS:                        10.3   6.36  )-----------( 218      ( 20 Aug 2022 07:42 )             30.8     08-19    136  |  98  |  15  ----------------------------<  112<H>  4.4   |  27  |  0.8    Ca    8.9      19 Aug 2022 08:21  Phos  3.7     08-19  Mg     1.7     08-19    TPro  5.7<L>  /  Alb  3.6  /  TBili  0.6  /  DBili  x   /  AST  16  /  ALT  10  /  AlkPhos  94  08-18      Urinalysis Basic - ( 18 Aug 2022 11:27 )    Color: Light Yellow / Appearance: Clear / S.009 / pH: x  Gluc: x / Ketone: Negative  / Bili: Negative / Urobili: <2 mg/dL   Blood: x / Protein: Trace / Nitrite: Negative   Leuk Esterase: Large / RBC: 5 /HPF / WBC 69 /HPF   Sq Epi: x / Non Sq Epi: 1 /HPF / Bacteria: Moderate

## 2022-08-20 NOTE — PROGRESS NOTE ADULT - ASSESSMENT
77 yo woman pmhx dm, htn, hld, OA, multiple UTIs presents w/ +diana UA, BL CVA tenderness, was diagnosed with pyelonephritis, gram negative bacteremia.       A/P   #  Complicated UTI/ acute pyelonephritis/ gram negative bacteremia   - c/w IV hydration   - kidney and bladder US noted   - monitor urine output  - f/u repeat blood Cx, f/u urine culture and sensitivity   - no evidence of sepsis on admission   - cefepime 2g q 12  - Tylenol PRN for fever   - pt was consulted regarding personal hygiene     # microcytic anemia / Iron deficiency   - start IV Venofer 200 mg Q 24 hours for 3 days   - monitor H/H, keep Hb above 7.5       # HTN  - c/w  losartan 100 mg Q 24 hours with parameters for BP   - hold off with diuretics     # DM  - carb consistent diet   - monitor finger sticks   - Insulin coverage while in the hospital     # OA  - tylenol/naproxen for pain    # HLD  -  lipid panel noted   - on statin    # Ovarian cyst  - will get transvaginal US       DVT lovenox  Diet dash/tlc cc  GI on  famotidine    #Progress Note Handoff  Pending (specify):  Tylenol PRN for fever, c/w IV hydration, f/u repeat blood Cx, start IV Venofer 200 mg Q 24 hours for 3 days   Family discussion: I spoke with pt and her family at the bedside, they agreed with a plan of care   Disposition: Home__x_/SNF___/Other________/Unknown at this time________

## 2022-08-21 LAB
-  AMIKACIN: SIGNIFICANT CHANGE UP
-  AMOXICILLIN/CLAVULANIC ACID: SIGNIFICANT CHANGE UP
-  AMPICILLIN/SULBACTAM: SIGNIFICANT CHANGE UP
-  AMPICILLIN: SIGNIFICANT CHANGE UP
-  AZTREONAM: SIGNIFICANT CHANGE UP
-  CEFAZOLIN: SIGNIFICANT CHANGE UP
-  CEFEPIME: SIGNIFICANT CHANGE UP
-  CEFTRIAXONE: SIGNIFICANT CHANGE UP
-  CIPROFLOXACIN: SIGNIFICANT CHANGE UP
-  ERTAPENEM: SIGNIFICANT CHANGE UP
-  GENTAMICIN: SIGNIFICANT CHANGE UP
-  IMIPENEM: SIGNIFICANT CHANGE UP
-  LEVOFLOXACIN: SIGNIFICANT CHANGE UP
-  MEROPENEM: SIGNIFICANT CHANGE UP
-  NITROFURANTOIN: SIGNIFICANT CHANGE UP
-  PIPERACILLIN/TAZOBACTAM: SIGNIFICANT CHANGE UP
-  TIGECYCLINE: SIGNIFICANT CHANGE UP
-  TOBRAMYCIN: SIGNIFICANT CHANGE UP
-  TRIMETHOPRIM/SULFAMETHOXAZOLE: SIGNIFICANT CHANGE UP
ALBUMIN SERPL ELPH-MCNC: 3.1 G/DL — LOW (ref 3.5–5.2)
ALP SERPL-CCNC: 164 U/L — HIGH (ref 30–115)
ALT FLD-CCNC: 39 U/L — SIGNIFICANT CHANGE UP (ref 0–41)
ANION GAP SERPL CALC-SCNC: 8 MMOL/L — SIGNIFICANT CHANGE UP (ref 7–14)
AST SERPL-CCNC: 41 U/L — SIGNIFICANT CHANGE UP (ref 0–41)
BASOPHILS # BLD AUTO: 0.01 K/UL — SIGNIFICANT CHANGE UP (ref 0–0.2)
BASOPHILS NFR BLD AUTO: 0.2 % — SIGNIFICANT CHANGE UP (ref 0–1)
BILIRUB SERPL-MCNC: 0.2 MG/DL — SIGNIFICANT CHANGE UP (ref 0.2–1.2)
BUN SERPL-MCNC: 10 MG/DL — SIGNIFICANT CHANGE UP (ref 10–20)
CALCIUM SERPL-MCNC: 8.8 MG/DL — SIGNIFICANT CHANGE UP (ref 8.5–10.1)
CHLORIDE SERPL-SCNC: 104 MMOL/L — SIGNIFICANT CHANGE UP (ref 98–110)
CO2 SERPL-SCNC: 26 MMOL/L — SIGNIFICANT CHANGE UP (ref 17–32)
CREAT SERPL-MCNC: 0.6 MG/DL — LOW (ref 0.7–1.5)
CULTURE RESULTS: SIGNIFICANT CHANGE UP
EGFR: 92 ML/MIN/1.73M2 — SIGNIFICANT CHANGE UP
EOSINOPHIL # BLD AUTO: 0.21 K/UL — SIGNIFICANT CHANGE UP (ref 0–0.7)
EOSINOPHIL NFR BLD AUTO: 4.1 % — SIGNIFICANT CHANGE UP (ref 0–8)
GLUCOSE BLDC GLUCOMTR-MCNC: 102 MG/DL — HIGH (ref 70–99)
GLUCOSE BLDC GLUCOMTR-MCNC: 110 MG/DL — HIGH (ref 70–99)
GLUCOSE BLDC GLUCOMTR-MCNC: 112 MG/DL — HIGH (ref 70–99)
GLUCOSE BLDC GLUCOMTR-MCNC: 115 MG/DL — HIGH (ref 70–99)
GLUCOSE SERPL-MCNC: 91 MG/DL — SIGNIFICANT CHANGE UP (ref 70–99)
HCT VFR BLD CALC: 27.8 % — LOW (ref 37–47)
HGB BLD-MCNC: 9.2 G/DL — LOW (ref 12–16)
IMM GRANULOCYTES NFR BLD AUTO: 0.4 % — HIGH (ref 0.1–0.3)
LYMPHOCYTES # BLD AUTO: 1.31 K/UL — SIGNIFICANT CHANGE UP (ref 1.2–3.4)
LYMPHOCYTES # BLD AUTO: 25.8 % — SIGNIFICANT CHANGE UP (ref 20.5–51.1)
MAGNESIUM SERPL-MCNC: 1.8 MG/DL — SIGNIFICANT CHANGE UP (ref 1.8–2.4)
MCHC RBC-ENTMCNC: 25.7 PG — LOW (ref 27–31)
MCHC RBC-ENTMCNC: 33.1 G/DL — SIGNIFICANT CHANGE UP (ref 32–37)
MCV RBC AUTO: 77.7 FL — LOW (ref 81–99)
METHOD TYPE: SIGNIFICANT CHANGE UP
MONOCYTES # BLD AUTO: 0.59 K/UL — SIGNIFICANT CHANGE UP (ref 0.1–0.6)
MONOCYTES NFR BLD AUTO: 11.6 % — HIGH (ref 1.7–9.3)
NEUTROPHILS # BLD AUTO: 2.94 K/UL — SIGNIFICANT CHANGE UP (ref 1.4–6.5)
NEUTROPHILS NFR BLD AUTO: 57.9 % — SIGNIFICANT CHANGE UP (ref 42.2–75.2)
NRBC # BLD: 0 /100 WBCS — SIGNIFICANT CHANGE UP (ref 0–0)
ORGANISM # SPEC MICROSCOPIC CNT: SIGNIFICANT CHANGE UP
ORGANISM # SPEC MICROSCOPIC CNT: SIGNIFICANT CHANGE UP
PLATELET # BLD AUTO: 221 K/UL — SIGNIFICANT CHANGE UP (ref 130–400)
POTASSIUM SERPL-MCNC: 4.1 MMOL/L — SIGNIFICANT CHANGE UP (ref 3.5–5)
POTASSIUM SERPL-SCNC: 4.1 MMOL/L — SIGNIFICANT CHANGE UP (ref 3.5–5)
PROT SERPL-MCNC: 5.4 G/DL — LOW (ref 6–8)
RBC # BLD: 3.58 M/UL — LOW (ref 4.2–5.4)
RBC # FLD: 15.2 % — HIGH (ref 11.5–14.5)
SODIUM SERPL-SCNC: 138 MMOL/L — SIGNIFICANT CHANGE UP (ref 135–146)
SPECIMEN SOURCE: SIGNIFICANT CHANGE UP
WBC # BLD: 5.08 K/UL — SIGNIFICANT CHANGE UP (ref 4.8–10.8)
WBC # FLD AUTO: 5.08 K/UL — SIGNIFICANT CHANGE UP (ref 4.8–10.8)

## 2022-08-21 PROCEDURE — 99233 SBSQ HOSP IP/OBS HIGH 50: CPT

## 2022-08-21 RX ORDER — MEROPENEM 1 G/30ML
1 INJECTION INTRAVENOUS ONCE
Refills: 0 | Status: DISCONTINUED | OUTPATIENT
Start: 2022-08-21 | End: 2022-08-21

## 2022-08-21 RX ORDER — ENOXAPARIN SODIUM 100 MG/ML
40 INJECTION SUBCUTANEOUS EVERY 24 HOURS
Refills: 0 | Status: DISCONTINUED | OUTPATIENT
Start: 2022-08-21 | End: 2022-08-24

## 2022-08-21 RX ORDER — MEROPENEM 1 G/30ML
1000 INJECTION INTRAVENOUS EVERY 8 HOURS
Refills: 0 | Status: COMPLETED | OUTPATIENT
Start: 2022-08-21 | End: 2022-08-22

## 2022-08-21 RX ORDER — MEROPENEM 1 G/30ML
INJECTION INTRAVENOUS
Refills: 0 | Status: DISCONTINUED | OUTPATIENT
Start: 2022-08-21 | End: 2022-08-21

## 2022-08-21 RX ORDER — MEROPENEM 1 G/30ML
1 INJECTION INTRAVENOUS EVERY 8 HOURS
Refills: 0 | Status: DISCONTINUED | OUTPATIENT
Start: 2022-08-21 | End: 2022-08-21

## 2022-08-21 RX ADMIN — LOSARTAN POTASSIUM 100 MILLIGRAM(S): 100 TABLET, FILM COATED ORAL at 05:12

## 2022-08-21 RX ADMIN — LIDOCAINE 1 PATCH: 4 CREAM TOPICAL at 11:47

## 2022-08-21 RX ADMIN — MEROPENEM 100 MILLIGRAM(S): 1 INJECTION INTRAVENOUS at 16:38

## 2022-08-21 RX ADMIN — MEROPENEM 100 MILLIGRAM(S): 1 INJECTION INTRAVENOUS at 21:19

## 2022-08-21 RX ADMIN — Medication 1000 UNIT(S): at 11:48

## 2022-08-21 RX ADMIN — LIDOCAINE 1 PATCH: 4 CREAM TOPICAL at 22:48

## 2022-08-21 RX ADMIN — Medication 81 MILLIGRAM(S): at 11:48

## 2022-08-21 RX ADMIN — ATORVASTATIN CALCIUM 20 MILLIGRAM(S): 80 TABLET, FILM COATED ORAL at 21:20

## 2022-08-21 RX ADMIN — Medication 1 TABLET(S): at 11:48

## 2022-08-21 RX ADMIN — LIDOCAINE 1 PATCH: 4 CREAM TOPICAL at 20:15

## 2022-08-21 RX ADMIN — IRON SUCROSE 110 MILLIGRAM(S): 20 INJECTION, SOLUTION INTRAVENOUS at 16:38

## 2022-08-21 RX ADMIN — Medication 1 PACKET(S): at 11:48

## 2022-08-21 RX ADMIN — SODIUM CHLORIDE 100 MILLILITER(S): 9 INJECTION, SOLUTION INTRAVENOUS at 21:59

## 2022-08-21 RX ADMIN — CEFEPIME 100 MILLIGRAM(S): 1 INJECTION, POWDER, FOR SOLUTION INTRAMUSCULAR; INTRAVENOUS at 05:11

## 2022-08-21 RX ADMIN — SODIUM CHLORIDE 100 MILLILITER(S): 9 INJECTION, SOLUTION INTRAVENOUS at 05:22

## 2022-08-21 NOTE — PROGRESS NOTE ADULT - ASSESSMENT
75 yo woman pmhx dm, htn, hld, OA, multiple UTIs presents w/ +diana UA, BL CVA tenderness, was diagnosed with pyelonephritis, gram negative bacteremia.       A/P   #  Complicated UTI/ acute pyelonephritis/ ESBL  bacteremia   - change Abx to Meropenem, ID consult   - c/w IV hydration   - kidney and bladder US noted   - monitor urine output  - no evidence of sepsis on admission   - Tylenol PRN for fever   - Lidoderm patch to right flank     # microcytic anemia / Iron deficiency   - on  IV Venofer 200 mg Q 24 hours for 3 days   - monitor H/H, keep Hb above 7.5       # HTN  - c/w  losartan 100 mg Q 24 hours with parameters for BP       # DM  - carb consistent diet   - monitor finger sticks   - Insulin coverage while in the hospital     # OA  - tylenol/naproxen for pain    # HLD  -  lipid panel noted   - on statin    # Ovarian cyst  - will get transvaginal US       DVT lovenox  Diet dash/tlc cc  GI on  famotidine    #Progress Note Handoff  Pending (specify):  Tylenol PRN for fever, c/w IV hydration, change Abx to Meropenem, consult ID, Lidoderm to right flank   Family discussion: I spoke with pt and her family at the bedside, they agreed with a plan of care   Disposition: Home__x_/SNF___/Other________/Unknown at this time________

## 2022-08-21 NOTE — PROGRESS NOTE ADULT - SUBJECTIVE AND OBJECTIVE BOX
Patient is a 76y old  Female who presents with a chief complaint of flank pain, was admitted for complicated UTI/pyelonephritis, was found to have gram negative bacteremia.   Today pt is c/o right sided flank pain, otherwise feels better.     Vital Signs Last 24 Hrs  T(C): 36 (21 Aug 2022 05:15), Max: 38.2 (20 Aug 2022 14:17)  T(F): 96.8 (21 Aug 2022 05:15), Max: 100.7 (20 Aug 2022 14:17)  HR: 62 (21 Aug 2022 05:15) (62 - 67)  BP: 151/67 (21 Aug 2022 05:15) (104/60 - 151/67)  BP(mean): --  RR: 20 (21 Aug 2022 05:15) (18 - 20)      PHYSICAL EXAM:  GENERAL: NAD, well-groomed, well-developed  HEAD:  Atraumatic, Normocephalic  EYES: EOMI, PERRLA, conjunctiva and sclera clear  ENMT: No tonsillar erythema, exudates, or enlargement; Moist mucous membranes, Good dentition, No lesions  NECK: Supple, No JVD, Normal thyroid  NERVOUS SYSTEM:  Alert & Oriented X3, Good concentration; Motor Strength 5/5 B/L upper and lower extremities; DTRs 2+ intact and symmetric  CHEST/LUNG: Clear to percussion bilaterally; No rales, rhonchi, wheezing, or rubs  HEART: Regular rate and rhythm; No murmurs, rubs, or gallops  ABDOMEN: Soft, Nontender, Nondistended; Bowel sounds present, b/l flank tenderness   EXTREMITIES:  2+ Peripheral Pulses, No clubbing, cyanosis, or edema  LYMPH: No lymphadenopathy noted  SKIN: No rashes or lesions      LABS:                                   9.2    5.08  )-----------( 221      ( 21 Aug 2022 07:33 )             27.8   08-21    138  |  104  |  10  ----------------------------<  91  4.1   |  26  |  0.6<L>    Ca    8.8      21 Aug 2022 07:33  Mg     1.8     08-    TPro  5.4<L>  /  Alb  3.1<L>  /  TBili  0.2  /  DBili  x   /  AST  41  /  ALT  39  /  AlkPhos  164<H>  08-      majooIron Total, Serum: 17 ug/dL (22 @ 07:42)     `  Urinalysis Basic - ( 18 Aug 2022 11:27 )    Color: Light Yellow / Appearance: Clear / S.009 / pH: x  Gluc: x / Ketone: Negative  / Bili: Negative / Urobili: <2 mg/dL   Blood: x / Protein: Trace / Nitrite: Negative   Leuk Esterase: Large / RBC: 5 /HPF / WBC 69 /HPF   Sq Epi: x / Non Sq Epi: 1 /HPF / Bacteria: Moderate        Culture - Blood (collected 18 Aug 2022 17:29)  Source: .Blood Blood  Gram Stain (19 Aug 2022 11:05):    Growth in anaerobic bottle: Gram Negative Rods    Growth in aerobic bottle: Gram Negative Rods  Preliminary Report (19 Aug 2022 11:05):    Growth in anaerobic bottle: Gram Negative Rods    Growth in aerobic bottle: Gram Negative Rods    ***Blood Panel PCR results on this specimen are available    approximately 3 hours after the Gram stain result.***    Gram stain, PCR, and/or culture results may not always    correspond due to difference in methodologies.    ************************************************************    This PCR assay was performed by multiplex PCR. This    Assay tests for 66 bacterial and resistance gene targets.    Please refer to the [facility] test directory    at [email or web address] for details.  Organism: Blood Culture PCR (19 Aug 2022 12:42)  Organism: Blood Culture PCR (19 Aug 2022 12:42)    Culture - Blood (collected 18 Aug 2022 17:29)  Source: .Blood Blood  Gram Stain (19 Aug 2022 13:11):    Growth in aerobic bottle: Gram Negative Rods    Growth in anaerobic bottle: Gram Negative Rods  Preliminary Report (19 Aug 2022 13:11):    Growth in aerobic bottle: Gram Negative Rods    Growth in anaerobic bottle: Gram Negative Rods    Culture - Blood (22 @ 21:42)   Specimen Source: .Blood None   Culture Results:   No growth to date. Culture - Urine (22 @ 13:30)   Specimen Source: Clean Catch Clean Catch (Midstream)   Culture Results:   No growth Culture - Blood (22 @ 17:29)   - ESBL: Detec   - Escherichia coli: Detec   - CTX-M Resistance Marker: Detec   Gram Stain:   Growth in anaerobic bottle: Gram Negative Rods   Growth in aerobic bottle: Gram Negative Rods   Specimen Source: .Blood Blood   Organism: Blood Culture PCR   RADIOLOGY & ADDITIONAL TESTS:    < from: CT Abdomen and Pelvis w/ IV Cont (22 @ 15:09) >  IMPRESSION:  1.  Findings suggest bilateral pyelitis and left pyelonephritis.  2.  3 cm left ovarian cyst. Follow-up pelvic ultrasound is recommended.  3.  Mild large airways inflammation noted at the lung bases.    < end of copied text >    < from: US Kidney and Bladder (22 @ 10:00) >  IMPRESSION:  1.  Unremarkable examination of bilateral kidneys.  2.  Urinary bladder not evaluated, as the patient has voided prior to the   examination.    < end of copied text >    MEDICATIONS  (STANDING):  aspirin enteric coated 81 milliGRAM(s) Oral daily  atorvastatin 20 milliGRAM(s) Oral at bedtime  cefepime   IVPB      cefepime   IVPB 2000 milliGRAM(s) IV Intermittent every 12 hours  cholecalciferol 1000 Unit(s) Oral daily  dextrose 5%. 1000 milliLiter(s) (100 mL/Hr) IV Continuous <Continuous>  dextrose 5%. 1000 milliLiter(s) (50 mL/Hr) IV Continuous <Continuous>  dextrose 50% Injectable 25 Gram(s) IV Push once  dextrose 50% Injectable 12.5 Gram(s) IV Push once  dextrose 50% Injectable 25 Gram(s) IV Push once  glucagon  Injectable 1 milliGRAM(s) IntraMuscular once  insulin lispro (ADMELOG) corrective regimen sliding scale   SubCutaneous three times a day before meals  iron sucrose IVPB 200 milliGRAM(s) IV Intermittent every 24 hours  lactated ringers. 1000 milliLiter(s) (100 mL/Hr) IV Continuous <Continuous>  lactobacillus acidophilus 1 Tablet(s) Oral daily  lidocaine   4% Patch 1 Patch Transdermal daily  lidocaine   4% Patch 1 Patch Transdermal daily  losartan 100 milliGRAM(s) Oral daily  losartan      potassium chloride    Tablet ER 40 milliEquivalent(s) Oral once  psyllium Powder 1 Packet(s) Oral daily    MEDICATIONS  (PRN):  acetaminophen     Tablet .. 650 milliGRAM(s) Oral every 6 hours PRN Temp greater or equal to 38C (100.4F), Mild Pain (1 - 3)  aluminum hydroxide/magnesium hydroxide/simethicone Suspension 30 milliLiter(s) Oral every 4 hours PRN Dyspepsia  dextrose Oral Gel 15 Gram(s) Oral once PRN Blood Glucose LESS THAN 70 milliGRAM(s)/deciliter  melatonin 3 milliGRAM(s) Oral at bedtime PRN Insomnia  naproxen 250 milliGRAM(s) Oral two times a day PRN Mild Pain (1 - 3)  ondansetron Injectable 4 milliGRAM(s) IV Push every 8 hours PRN Nausea and/or Vomiting

## 2022-08-22 LAB
-  AMIKACIN: SIGNIFICANT CHANGE UP
-  AMPICILLIN/SULBACTAM: SIGNIFICANT CHANGE UP
-  AMPICILLIN: SIGNIFICANT CHANGE UP
-  AZTREONAM: SIGNIFICANT CHANGE UP
-  CEFAZOLIN: SIGNIFICANT CHANGE UP
-  CEFEPIME: SIGNIFICANT CHANGE UP
-  CEFTRIAXONE: SIGNIFICANT CHANGE UP
-  CIPROFLOXACIN: SIGNIFICANT CHANGE UP
-  ERTAPENEM: SIGNIFICANT CHANGE UP
-  GENTAMICIN: SIGNIFICANT CHANGE UP
-  IMIPENEM: SIGNIFICANT CHANGE UP
-  LEVOFLOXACIN: SIGNIFICANT CHANGE UP
-  MEROPENEM: SIGNIFICANT CHANGE UP
-  PIPERACILLIN/TAZOBACTAM: SIGNIFICANT CHANGE UP
-  TOBRAMYCIN: SIGNIFICANT CHANGE UP
-  TRIMETHOPRIM/SULFAMETHOXAZOLE: SIGNIFICANT CHANGE UP
ALBUMIN SERPL ELPH-MCNC: 3.5 G/DL — SIGNIFICANT CHANGE UP (ref 3.5–5.2)
ALP SERPL-CCNC: 168 U/L — HIGH (ref 30–115)
ALT FLD-CCNC: 31 U/L — SIGNIFICANT CHANGE UP (ref 0–41)
ANION GAP SERPL CALC-SCNC: 11 MMOL/L — SIGNIFICANT CHANGE UP (ref 7–14)
ANISOCYTOSIS BLD QL: SLIGHT — SIGNIFICANT CHANGE UP
AST SERPL-CCNC: 24 U/L — SIGNIFICANT CHANGE UP (ref 0–41)
BASOPHILS # BLD AUTO: 0 K/UL — SIGNIFICANT CHANGE UP (ref 0–0.2)
BASOPHILS NFR BLD AUTO: 0 % — SIGNIFICANT CHANGE UP (ref 0–1)
BILIRUB SERPL-MCNC: 0.3 MG/DL — SIGNIFICANT CHANGE UP (ref 0.2–1.2)
BUN SERPL-MCNC: 8 MG/DL — LOW (ref 10–20)
BURR CELLS BLD QL SMEAR: SIGNIFICANT CHANGE UP
CALCIUM SERPL-MCNC: 8.9 MG/DL — SIGNIFICANT CHANGE UP (ref 8.5–10.1)
CHLORIDE SERPL-SCNC: 104 MMOL/L — SIGNIFICANT CHANGE UP (ref 98–110)
CO2 SERPL-SCNC: 26 MMOL/L — SIGNIFICANT CHANGE UP (ref 17–32)
CREAT SERPL-MCNC: 0.6 MG/DL — LOW (ref 0.7–1.5)
CULTURE RESULTS: SIGNIFICANT CHANGE UP
CULTURE RESULTS: SIGNIFICANT CHANGE UP
EGFR: 92 ML/MIN/1.73M2 — SIGNIFICANT CHANGE UP
ELLIPTOCYTES BLD QL SMEAR: SLIGHT — SIGNIFICANT CHANGE UP
EOSINOPHIL # BLD AUTO: 0.35 K/UL — SIGNIFICANT CHANGE UP (ref 0–0.7)
EOSINOPHIL NFR BLD AUTO: 6.1 % — SIGNIFICANT CHANGE UP (ref 0–8)
GLUCOSE BLDC GLUCOMTR-MCNC: 106 MG/DL — HIGH (ref 70–99)
GLUCOSE SERPL-MCNC: 98 MG/DL — SIGNIFICANT CHANGE UP (ref 70–99)
HCT VFR BLD CALC: 30 % — LOW (ref 37–47)
HGB BLD-MCNC: 9.7 G/DL — LOW (ref 12–16)
HYPOCHROMIA BLD QL: SLIGHT — SIGNIFICANT CHANGE UP
LYMPHOCYTES # BLD AUTO: 1.14 K/UL — LOW (ref 1.2–3.4)
LYMPHOCYTES # BLD AUTO: 20 % — LOW (ref 20.5–51.1)
MACROCYTES BLD QL: SLIGHT — SIGNIFICANT CHANGE UP
MAGNESIUM SERPL-MCNC: 1.9 MG/DL — SIGNIFICANT CHANGE UP (ref 1.8–2.4)
MANUAL SMEAR VERIFICATION: SIGNIFICANT CHANGE UP
MCHC RBC-ENTMCNC: 25.5 PG — LOW (ref 27–31)
MCHC RBC-ENTMCNC: 32.3 G/DL — SIGNIFICANT CHANGE UP (ref 32–37)
MCV RBC AUTO: 78.7 FL — LOW (ref 81–99)
METHOD TYPE: SIGNIFICANT CHANGE UP
MONOCYTES # BLD AUTO: 0.35 K/UL — SIGNIFICANT CHANGE UP (ref 0.1–0.6)
MONOCYTES NFR BLD AUTO: 6.1 % — SIGNIFICANT CHANGE UP (ref 1.7–9.3)
NEUTROPHILS # BLD AUTO: 3.75 K/UL — SIGNIFICANT CHANGE UP (ref 1.4–6.5)
NEUTROPHILS NFR BLD AUTO: 66.1 % — SIGNIFICANT CHANGE UP (ref 42.2–75.2)
ORGANISM # SPEC MICROSCOPIC CNT: SIGNIFICANT CHANGE UP
OVALOCYTES BLD QL SMEAR: SLIGHT — SIGNIFICANT CHANGE UP
PLAT MORPH BLD: NORMAL — SIGNIFICANT CHANGE UP
PLATELET # BLD AUTO: 282 K/UL — SIGNIFICANT CHANGE UP (ref 130–400)
POIKILOCYTOSIS BLD QL AUTO: SIGNIFICANT CHANGE UP
POLYCHROMASIA BLD QL SMEAR: SLIGHT — SIGNIFICANT CHANGE UP
POTASSIUM SERPL-MCNC: 4.1 MMOL/L — SIGNIFICANT CHANGE UP (ref 3.5–5)
POTASSIUM SERPL-SCNC: 4.1 MMOL/L — SIGNIFICANT CHANGE UP (ref 3.5–5)
PROT SERPL-MCNC: 5.7 G/DL — LOW (ref 6–8)
RBC # BLD: 3.81 M/UL — LOW (ref 4.2–5.4)
RBC # FLD: 15.6 % — HIGH (ref 11.5–14.5)
RBC BLD AUTO: ABNORMAL
SODIUM SERPL-SCNC: 141 MMOL/L — SIGNIFICANT CHANGE UP (ref 135–146)
SPECIMEN SOURCE: SIGNIFICANT CHANGE UP
SPECIMEN SOURCE: SIGNIFICANT CHANGE UP
VARIANT LYMPHS # BLD: 1.7 % — SIGNIFICANT CHANGE UP (ref 0–5)
WBC # BLD: 5.68 K/UL — SIGNIFICANT CHANGE UP (ref 4.8–10.8)
WBC # FLD AUTO: 5.68 K/UL — SIGNIFICANT CHANGE UP (ref 4.8–10.8)

## 2022-08-22 PROCEDURE — 99233 SBSQ HOSP IP/OBS HIGH 50: CPT

## 2022-08-22 PROCEDURE — 36000 PLACE NEEDLE IN VEIN: CPT

## 2022-08-22 PROCEDURE — 76937 US GUIDE VASCULAR ACCESS: CPT | Mod: 26

## 2022-08-22 RX ORDER — MEROPENEM 1 G/30ML
1000 INJECTION INTRAVENOUS EVERY 8 HOURS
Refills: 0 | Status: DISCONTINUED | OUTPATIENT
Start: 2022-08-22 | End: 2022-08-24

## 2022-08-22 RX ADMIN — LIDOCAINE 1 PATCH: 4 CREAM TOPICAL at 19:59

## 2022-08-22 RX ADMIN — Medication 1000 UNIT(S): at 12:08

## 2022-08-22 RX ADMIN — ATORVASTATIN CALCIUM 20 MILLIGRAM(S): 80 TABLET, FILM COATED ORAL at 21:37

## 2022-08-22 RX ADMIN — Medication 1 TABLET(S): at 12:08

## 2022-08-22 RX ADMIN — Medication 1 PACKET(S): at 12:09

## 2022-08-22 RX ADMIN — Medication 81 MILLIGRAM(S): at 12:08

## 2022-08-22 RX ADMIN — LOSARTAN POTASSIUM 100 MILLIGRAM(S): 100 TABLET, FILM COATED ORAL at 05:59

## 2022-08-22 RX ADMIN — IRON SUCROSE 110 MILLIGRAM(S): 20 INJECTION, SOLUTION INTRAVENOUS at 16:43

## 2022-08-22 RX ADMIN — MEROPENEM 100 MILLIGRAM(S): 1 INJECTION INTRAVENOUS at 21:37

## 2022-08-22 RX ADMIN — LIDOCAINE 1 PATCH: 4 CREAM TOPICAL at 12:09

## 2022-08-22 RX ADMIN — MEROPENEM 100 MILLIGRAM(S): 1 INJECTION INTRAVENOUS at 05:58

## 2022-08-22 RX ADMIN — ENOXAPARIN SODIUM 40 MILLIGRAM(S): 100 INJECTION SUBCUTANEOUS at 17:02

## 2022-08-22 NOTE — PROGRESS NOTE ADULT - SUBJECTIVE AND OBJECTIVE BOX
SREEKANTH NUÑEZ 76y Female  MRN#: [id number]   CODE STATUS:     Hospital Day: 4d    Pt is currently admitted with the primary diagnosis of b/l pyelonephritis w ESBL bacteremia - most recent (+) BCx 8/18/22     SUBJECTIVE  Hospital Course      Overnight events      Subjective complaints      Present Today:   - Leonard:  No [  ], Yes [  ] : Indication:     - Type of IV Access:       .. CVC/Piccline:  No [  ], Yes [  ] : Indication:       .. Midline: No [  ], Yes [  ] : Indication:                                              OBJECTIVE  PAST MEDICAL & SURGICAL HISTORY  HTN (hypertension)                                                ALLERGIES:  Benadryl (Unknown)  sulfamethoxazole (Unknown)                           HOME MEDICATIONS  Home Medications:  Acidophilus oral tablet: 2 tab(s) orally once a day (18 Aug 2022 21:24)  aspirin 81 mg oral tablet: 1 tab(s) orally once a day (18 Aug 2022 21:25)  atorvastatin 20 mg oral tablet: 1 tab(s) orally once a day (18 Aug 2022 21:25)  cholecalciferol 25 mcg (1000 intl units) oral tablet: 1 tab(s) orally once a day (18 Aug 2022 21:26)  Cranberry oral tablet: 1 tab(s) orally once a day (18 Aug 2022 21:26)  diclofenac 1% topical gel: Apply topically to affected area once a day, As Needed (18 Aug 2022 21:27)  glucosamine-chondroitin 500 mg-400 mg oral tablet: 1 tab(s) orally once a day (18 Aug 2022 21:27)  losartan-hydrochlorothiazide 100 mg-25 mg oral tablet: 1 tab(s) orally once a day (18 Aug 2022 21:28)  metFORMIN 500 mg oral tablet: 1 tab(s) orally 2 times a day (18 Aug 2022 21:28)  psyllium 50% oral powder for reconstitution: 1 application orally once a day (18 Aug 2022 21:29)                           MEDICATIONS:  STANDING MEDICATIONS  aspirin enteric coated 81 milliGRAM(s) Oral daily  atorvastatin 20 milliGRAM(s) Oral at bedtime  cholecalciferol 1000 Unit(s) Oral daily  enoxaparin Injectable 40 milliGRAM(s) SubCutaneous every 24 hours  lactobacillus acidophilus 1 Tablet(s) Oral daily  lidocaine   4% Patch 1 Patch Transdermal daily  lidocaine   4% Patch 1 Patch Transdermal daily  losartan 100 milliGRAM(s) Oral daily  losartan      meropenem  IVPB 1000 milliGRAM(s) IV Intermittent every 8 hours  potassium chloride    Tablet ER 40 milliEquivalent(s) Oral once  psyllium Powder 1 Packet(s) Oral daily    PRN MEDICATIONS  acetaminophen     Tablet .. 650 milliGRAM(s) Oral every 6 hours PRN  aluminum hydroxide/magnesium hydroxide/simethicone Suspension 30 milliLiter(s) Oral every 4 hours PRN  melatonin 3 milliGRAM(s) Oral at bedtime PRN  naproxen 250 milliGRAM(s) Oral two times a day PRN  ondansetron Injectable 4 milliGRAM(s) IV Push every 8 hours PRN                                            ------------------------------------------------------------  VITAL SIGNS: Last 24 Hours  T(C): 36.5 (22 Aug 2022 19:32), Max: 37.3 (22 Aug 2022 14:41)  T(F): 97.7 (22 Aug 2022 19:32), Max: 99.2 (22 Aug 2022 14:41)  HR: 67 (22 Aug 2022 19:32) (63 - 70)  BP: 119/58 (22 Aug 2022 19:32) (119/58 - 173/72)  BP(mean): --  RR: 18 (22 Aug 2022 19:32) (18 - 20)  SpO2: --                                               LABS:                        9.7    5.68  )-----------( 282      ( 22 Aug 2022 07:13 )             30.0     08-22    141  |  104  |  8<L>  ----------------------------<  98  4.1   |  26  |  0.6<L>    Ca    8.9      22 Aug 2022 07:13  Mg     1.9     08-22    TPro  5.7<L>  /  Alb  3.5  /  TBili  0.3  /  DBili  x   /  AST  24  /  ALT  31  /  AlkPhos  168<H>  08-22                Culture - Blood (collected 21 Aug 2022 07:33)  Source: .Blood None  Preliminary Report (22 Aug 2022 17:02):    No growth to date.    Culture - Blood (collected 20 Aug 2022 07:42)  Source: .Blood None  Preliminary Report (21 Aug 2022 19:01):    No growth to date.    Culture - Blood (collected 19 Aug 2022 21:42)  Source: .Blood None  Preliminary Report (21 Aug 2022 02:01):    No growth to date.                                                    RADIOLOGY:             SREEKANTH NUÑEZ 76y Female  MRN#: [id number]   CODE STATUS: FULL CODE     Hospital Day: 4d    Pt is currently admitted with the primary diagnosis of b/l pyelitis and L pyelonephritis w ESBL bacteremia - most recent (+) BCx 8/18/22     SUBJECTIVE  Hospital Course  [place] speaking 75 y/o woman w PMHx of HTN, HLD, DM, h/o UTIs, OA R knee, presented to ED on 8/18/22 for b/l flank pain w nausea and f/c x3days. In ED had /64, HR 65, RR 18, 96% RA, T 99.1 w labs notable for WBC 9.68, Hg 11.5, K 3.4. UA w (+) LE, WBC 69, epithelial cells 1. CT abd suggestive of b/l pyelitis and L pyelonephritis. Received 2L LR and 1g CTX, admitted to medicine.     As inpatient has been on Cefepime 2g q12h for 4 days 8/18-8/21/22, w BCx drawn 8/18/22 growing ESBL and subsequent change to Meropenem starting 8/21/22 - plan for dc home on Ertapenem thru 8/27/22 for total 7 days through midline. U/S kidney and bladder on 8/20/22 unremarkable, bladder not evaluated. Also noted incidental 2.7cm L ovarian cyst w rec for 1 year f/u TVUS.       Overnight events  None significant    Subjective complaints  Notes some lower abd tenderness/discomfort, primarily in suprapubic area. Denies dysuria, fevers, chills. States overall feeling much improved.     PHYSICAL EXAM:  GENERAL: NAD, lying in bed comfortably  HEAD:  Atraumatic, Normocephalic  EYES: EOMI, sclera clear  ENT: Moist mucous membranes  NECK: Supple  CHEST/LUNG: Clear to auscultation anteriorly bilaterally; No rales, rhonchi, wheezing, or rubs. Unlabored respirations  HEART: Regular rate and rhythm; No murmurs, rubs, or gallops  ABDOMEN: (+)BS; Soft, nondistended. (+) mild TTP to lower abd, most prominently at suprapubic area. No rebound or guarding.   : (+) b/l CVAT   EXTREMITIES:  2+ Peripheral Pulses, brisk capillary refill. No clubbing, cyanosis, or edema  NERVOUS SYSTEM:  A&Ox3, no focal deficits   SKIN: No rashes or lesions       Present Today:   - Leonard:  No [ X ], Yes [  ]    - Type of IV Access:       .. CVC/Piccline:  No [ X ], Yes [  ]       .. Midline: No [  ], Yes [ X ] : Indication: will be d/c on IV abx                                              OBJECTIVE  PAST MEDICAL & SURGICAL HISTORY  HTN (hypertension)                                                ALLERGIES:  Benadryl (Unknown)  sulfamethoxazole (Unknown)                           HOME MEDICATIONS  Home Medications:  Acidophilus oral tablet: 2 tab(s) orally once a day (18 Aug 2022 21:24)  aspirin 81 mg oral tablet: 1 tab(s) orally once a day (18 Aug 2022 21:25)  atorvastatin 20 mg oral tablet: 1 tab(s) orally once a day (18 Aug 2022 21:25)  cholecalciferol 25 mcg (1000 intl units) oral tablet: 1 tab(s) orally once a day (18 Aug 2022 21:26)  Cranberry oral tablet: 1 tab(s) orally once a day (18 Aug 2022 21:26)  diclofenac 1% topical gel: Apply topically to affected area once a day, As Needed (18 Aug 2022 21:27)  glucosamine-chondroitin 500 mg-400 mg oral tablet: 1 tab(s) orally once a day (18 Aug 2022 21:27)  losartan-hydrochlorothiazide 100 mg-25 mg oral tablet: 1 tab(s) orally once a day (18 Aug 2022 21:28)  metFORMIN 500 mg oral tablet: 1 tab(s) orally 2 times a day (18 Aug 2022 21:28)  psyllium 50% oral powder for reconstitution: 1 application orally once a day (18 Aug 2022 21:29)                           MEDICATIONS:  STANDING MEDICATIONS  aspirin enteric coated 81 milliGRAM(s) Oral daily  atorvastatin 20 milliGRAM(s) Oral at bedtime  cholecalciferol 1000 Unit(s) Oral daily  enoxaparin Injectable 40 milliGRAM(s) SubCutaneous every 24 hours  lactobacillus acidophilus 1 Tablet(s) Oral daily  lidocaine   4% Patch 1 Patch Transdermal daily  lidocaine   4% Patch 1 Patch Transdermal daily  losartan 100 milliGRAM(s) Oral daily  losartan      meropenem  IVPB 1000 milliGRAM(s) IV Intermittent every 8 hours  potassium chloride    Tablet ER 40 milliEquivalent(s) Oral once  psyllium Powder 1 Packet(s) Oral daily    PRN MEDICATIONS  acetaminophen     Tablet .. 650 milliGRAM(s) Oral every 6 hours PRN  aluminum hydroxide/magnesium hydroxide/simethicone Suspension 30 milliLiter(s) Oral every 4 hours PRN  melatonin 3 milliGRAM(s) Oral at bedtime PRN  naproxen 250 milliGRAM(s) Oral two times a day PRN  ondansetron Injectable 4 milliGRAM(s) IV Push every 8 hours PRN                                            ------------------------------------------------------------  VITAL SIGNS: Last 24 Hours  T(C): 36.5 (22 Aug 2022 19:32), Max: 37.3 (22 Aug 2022 14:41)  T(F): 97.7 (22 Aug 2022 19:32), Max: 99.2 (22 Aug 2022 14:41)  HR: 67 (22 Aug 2022 19:32) (63 - 70)  BP: 119/58 (22 Aug 2022 19:32) (119/58 - 173/72)  BP(mean): --  RR: 18 (22 Aug 2022 19:32) (18 - 20)  SpO2: --                                               LABS:                        9.7    5.68  )-----------( 282      ( 22 Aug 2022 07:13 )             30.0     08-22    141  |  104  |  8<L>  ----------------------------<  98  4.1   |  26  |  0.6<L>    Ca    8.9      22 Aug 2022 07:13  Mg     1.9     08-22    TPro  5.7<L>  /  Alb  3.5  /  TBili  0.3  /  DBili  x   /  AST  24  /  ALT  31  /  AlkPhos  168<H>  08-22                Culture - Blood (collected 21 Aug 2022 07:33)  Source: .Blood None  Preliminary Report (22 Aug 2022 17:02):    No growth to date.    Culture - Blood (collected 20 Aug 2022 07:42)  Source: .Blood None  Preliminary Report (21 Aug 2022 19:01):    No growth to date.    Culture - Blood (collected 19 Aug 2022 21:42)  Source: .Blood None  Preliminary Report (21 Aug 2022 02:01):    No growth to date.                                                    RADIOLOGY:    U/S Kidney Bladder 8/20/22:   1.  Unremarkable examination of bilateral kidneys.  2.  Urinary bladder not evaluated, as the patient has voided prior to the   examination.    TVUS 8/19/22:   The uterus is heterogeneous with endometrium difficult to delineated, however possibly abnormally thickened at 1.6 cm within a postmenopausal patient. Outpatient MR abdomen with and without IV contrast may be helpful following discharge for better assessment of endometrium and to evaluate for potential abnormal endometrial thickening.  Left ovarian 2.7 cm simple cyst. Follow-up pelvic ultrasound in one year recommended in postmenopausal patient.    CT Abd/Pelvis 8/18/22:   1.  Findings suggest bilateral pyelitis and left pyelonephritis. w/o hydro   2.  3 cm left ovarian cyst. Follow-up pelvic ultrasound is recommended.  3.  Mild large airways inflammation noted at the lung bases.  (+) diverticulosis

## 2022-08-22 NOTE — PROCEDURE NOTE - ADDITIONAL PROCEDURE DETAILS
Procedure team was consulted to perform  urgent midline procedure for the diagnosis of  pyelonephritis. I was present for the key critical aspects of the procedure performed during the care of the patient.  Utilizing ultrasound guidance,  a midline  was inserted into the  right upper arm. Prior to needle insertion, the patency of the vein was confirmed with ultrasound.  The representative images are stored on the [employer] application Procedure team was consulted to perform  urgent midline procedure for the diagnosis of  pyelonephritis. I was present for the key critical aspects of the procedure performed during the care of the patient.  Utilizing ultrasound guidance,  a 10cm midline  was inserted into the  right upper arm. Prior to needle insertion, the patency of the vein was confirmed with ultrasound.  The representative images are stored on the [employer] application    Addendum: spoke w Dr Prasad who placed midline to confirm length of midline- 10cm.

## 2022-08-22 NOTE — PROGRESS NOTE ADULT - SUBJECTIVE AND OBJECTIVE BOX
Patient is a 76y old  Female who presents with a chief complaint of flank pain, was admitted for complicated UTI/pyelonephritis, was found to have gram negative bacteremia.   Today pt feels better, has no complaints, asking about discharge.     Vital Signs Last 24 Hrs  T(C): 36.4 (22 Aug 2022 05:15), Max: 36.4 (22 Aug 2022 05:15)  T(F): 97.5 (22 Aug 2022 05:15), Max: 97.5 (22 Aug 2022 05:15)  HR: 64 (22 Aug 2022 05:15) (63 - 64)  BP: 136/65 (22 Aug 2022 05:15) (136/65 - 152/70)  BP(mean): --  RR: 18 (22 Aug 2022 05:15) (18 - 20)      PHYSICAL EXAM:  GENERAL: NAD, well-groomed, well-developed  HEAD:  Atraumatic, Normocephalic  EYES: EOMI, PERRLA, conjunctiva and sclera clear  ENMT: No tonsillar erythema, exudates, or enlargement; Moist mucous membranes, Good dentition, No lesions  NECK: Supple, No JVD, Normal thyroid  NERVOUS SYSTEM:  Alert & Oriented X3, Good concentration; Motor Strength 5/5 B/L upper and lower extremities; DTRs 2+ intact and symmetric  CHEST/LUNG: Clear to percussion bilaterally; No rales, rhonchi, wheezing, or rubs  HEART: Regular rate and rhythm; No murmurs, rubs, or gallops  ABDOMEN: Soft, Nontender, Nondistended; Bowel sounds present, b/l flank tenderness   EXTREMITIES:  2+ Peripheral Pulses, No clubbing, cyanosis, or edema  LYMPH: No lymphadenopathy noted  SKIN: No rashes or lesions      LABS:                                   9.7    5.68  )-----------( 282      ( 22 Aug 2022 07:13 )             30.0   08-22    141  |  104  |  8<L>  ----------------------------<  98  4.1   |  26  |  0.6<L>    Ca    8.9      22 Aug 2022 07:13  Mg     1.9     08-    TPro  5.7<L>  /  Alb  3.5  /  TBili  0.3  /  DBili  x   /  AST  24  /  ALT  31  /  AlkPhos  168<H>      Arely Total, Serum: 17 ug/dL (22 @ 07:42)     `  Urinalysis Basic - ( 18 Aug 2022 11:27 )    Color: Light Yellow / Appearance: Clear / S.009 / pH: x  Gluc: x / Ketone: Negative  / Bili: Negative / Urobili: <2 mg/dL   Blood: x / Protein: Trace / Nitrite: Negative   Leuk Esterase: Large / RBC: 5 /HPF / WBC 69 /HPF   Sq Epi: x / Non Sq Epi: 1 /HPF / Bacteria: Moderate        Culture - Blood (collected 18 Aug 2022 17:29)  Source: .Blood Blood  Gram Stain (19 Aug 2022 11:05):    Growth in anaerobic bottle: Gram Negative Rods    Growth in aerobic bottle: Gram Negative Rods  Preliminary Report (19 Aug 2022 11:05):    Growth in anaerobic bottle: Gram Negative Rods    Growth in aerobic bottle: Gram Negative Rods    ***Blood Panel PCR results on this specimen are available    approximately 3 hours after the Gram stain result.***    Gram stain, PCR, and/or culture results may not always    correspond due to difference in methodologies.    ************************************************************    This PCR assay was performed by multiplex PCR. This    Assay tests for 66 bacterial and resistance gene targets.    Please refer to the [facility] test directory    at [email or web address] for details.  Organism: Blood Culture PCR (19 Aug 2022 12:42)  Organism: Blood Culture PCR (19 Aug 2022 12:42)    Culture - Blood (collected 18 Aug 2022 17:29)  Source: .Blood Blood  Gram Stain (19 Aug 2022 13:11):    Growth in aerobic bottle: Gram Negative Rods    Growth in anaerobic bottle: Gram Negative Rods  Preliminary Report (19 Aug 2022 13:11):    Growth in aerobic bottle: Gram Negative Rods    Growth in anaerobic bottle: Gram Negative Rods    Culture - Blood (22 @ 21:42)   Specimen Source: .Blood None   Culture Results:   No growth to date. Culture - Urine (22 @ 13:30)   Specimen Source: Clean Catch Clean Catch (Midstream)   Culture Results:   No growth Culture - Blood (22 @ 17:29)   - ESBL: Detec   - Escherichia coli: Detec   - CTX-M Resistance Marker: Detec   Gram Stain:   Growth in anaerobic bottle: Gram Negative Rods   Growth in aerobic bottle: Gram Negative Rods   Specimen Source: .Blood Blood   Organism: Blood Culture PCR   RADIOLOGY & ADDITIONAL TESTS:    < from: CT Abdomen and Pelvis w/ IV Cont (22 @ 15:09) >  IMPRESSION:  1.  Findings suggest bilateral pyelitis and left pyelonephritis.  2.  3 cm left ovarian cyst. Follow-up pelvic ultrasound is recommended.  3.  Mild large airways inflammation noted at the lung bases.    < end of copied text >    < from: US Kidney and Bladder (22 @ 10:00) >  IMPRESSION:  1.  Unremarkable examination of bilateral kidneys.  2.  Urinary bladder not evaluated, as the patient has voided prior to the   examination.    < end of copied text >    MEDICATIONS  (STANDING):  aspirin enteric coated 81 milliGRAM(s) Oral daily  atorvastatin 20 milliGRAM(s) Oral at bedtime  cholecalciferol 1000 Unit(s) Oral daily  enoxaparin Injectable 40 milliGRAM(s) SubCutaneous every 24 hours  iron sucrose IVPB 200 milliGRAM(s) IV Intermittent every 24 hours  lactobacillus acidophilus 1 Tablet(s) Oral daily  lidocaine   4% Patch 1 Patch Transdermal daily  lidocaine   4% Patch 1 Patch Transdermal daily  losartan      losartan 100 milliGRAM(s) Oral daily  potassium chloride    Tablet ER 40 milliEquivalent(s) Oral once  psyllium Powder 1 Packet(s) Oral daily    MEDICATIONS  (PRN):  acetaminophen     Tablet .. 650 milliGRAM(s) Oral every 6 hours PRN Temp greater or equal to 38C (100.4F), Mild Pain (1 - 3)  aluminum hydroxide/magnesium hydroxide/simethicone Suspension 30 milliLiter(s) Oral every 4 hours PRN Dyspepsia  melatonin 3 milliGRAM(s) Oral at bedtime PRN Insomnia  naproxen 250 milliGRAM(s) Oral two times a day PRN Mild Pain (1 - 3)  ondansetron Injectable 4 milliGRAM(s) IV Push every 8 hours PRN Nausea and/or Vomiting

## 2022-08-22 NOTE — CONSULT NOTE ADULT - SUBJECTIVE AND OBJECTIVE BOX
SREEKANTH NUÑEZ  76y, Female  Allergy: Benadryl (Unknown)  sulfamethoxazole (Unknown)    CHIEF COMPLAINT: pyelonephritis (20 Aug 2022 10:22)    HPI:  Pt is a 75 y/o woman with a PMH of multiple UTIs (one prior this year in June), DM, HTN, OA of R knee who presented on 8/18 with fever and chills for 2 days. Pt was in her usual state of health when she became nauseous, unable to tolerate eating, and had a fever with chills. . On the morning of admission pt, woke up with b/l flank pain with persisting nausea, fever, and chills without dysuria or hematuria and decided to come to the ED. Pt denies any prior Hx of kidney stones    ED  /64 HR 65 RR 18 96% RA T 99.1  WBC 9.68 Hg 11.5 K 3.4  UA LEC+diana WBC 69 epithelial cells 1  CT abd suggestive of BL pyelitis and L pyelonephritis     Pt given 2L LR, 1g ceftriaxone, admitted to medicine   (18 Aug 2022 21:41)    Used , Sarah [id number]  Pt reports feeling better today. Denies chills, nausea, diarrhea, flank pain or dysuria. Pt eager for discharge. No acute events overnight.    FAMILY HISTORY:   Noncontributory    PAST MEDICAL & SURGICAL HISTORY:  HTN (hypertension)  DM  OA  Multiple UTIs    SOCIAL HISTORY    Substance Use ( x ) never used  (  ) IVDU (  ) Other:  Tobacco Usage:  ( x ) never smoked   (   ) former smoker   (   ) current smoker   Alcohol Usage: ( x ) social  (   ) daily use (   ) denies  Sexual History: Not currently sexually active    Recent travel to [facility] and [facility]. No recent sick contacts.  COVID vaccinated x 2 with 2 or 3 boosters (cannot remember)      ROS  General: Denies chills.  HEENT: Endorses headache last night. Denies, rhinorrhea, sore throat, or cough  CV: Denies chest pain, palpitations  PULM: Denies wheezing or shortness of breath  GI: Denies abdominal pain, nausea, vomiting, or diarrhea  : Denies discharge, hematuria, or dysuria  MSK: Endorses pain on R forearm at site of prior IV. Denies arthralgias, myalgias.  SKIN: Denies rash, lesions  NEURO: Denies paresthesias, weakness    VITALS:  T(F): 97.5, Max: 97.5 (08-22-22 @ 05:15)  HR: 64  BP: 136/65  RR: 18Vital Signs Last 24 Hrs  T(C): 36.4 (22 Aug 2022 05:15), Max: 36.4 (22 Aug 2022 05:15)  T(F): 97.5 (22 Aug 2022 05:15), Max: 97.5 (22 Aug 2022 05:15)  HR: 64 (22 Aug 2022 05:15) (57 - 64)  BP: 136/65 (22 Aug 2022 05:15) (136/65 - 152/70)  BP(mean): --  RR: 18 (22 Aug 2022 05:15) (18 - 20)  SpO2: --    PHYSICAL EXAM:  Gen: Sitting up in bed. In NAD.  HEENT: Normocephalic, atraumatic  Neck: supple, no lymphadenopathy  CV: Regular rate & regular rhythm. S1, S2. No rubs, murmurs, or gallops  Lungs: Clear to auscultation b/l. No rales, wheezes, or rhonchi. Normal respiratory effort.  Abdomen: Soft, non-distended, non-tender. Normoactive BS.  Ext: Warm, well perfused  Neuro: AAOx4. No focal deficits.  Skin: no rash, no erythema    TESTS & MEASUREMENTS:                        9.7    5.68  )-----------( 282      ( 22 Aug 2022 07:13 )             30.0     08-21    138  |  104  |  10  ----------------------------<  91  4.1   |  26  |  0.6<L>    Ca    8.8      21 Aug 2022 07:33  Mg     1.8     08-21    TPro  5.4<L>  /  Alb  3.1<L>  /  TBili  0.2  /  DBili  x   /  AST  41  /  ALT  39  /  AlkPhos  164<H>  08-21    LIVER FUNCTIONS - ( 21 Aug 2022 07:33 )  Alb: 3.1 g/dL / Pro: 5.4 g/dL / ALK PHOS: 164 U/L / ALT: 39 U/L / AST: 41 U/L / GGT: x           Culture - Blood (collected 08-20-22 @ 07:42)  Source: .Blood None  Preliminary Report (08-21-22 @ 19:01):    No growth to date.    Culture - Blood (collected 08-19-22 @ 21:42)  Source: .Blood None  Preliminary Report (08-21-22 @ 02:01):    No growth to date.    Culture - Urine (collected 08-19-22 @ 13:30)  Source: Clean Catch Clean Catch (Midstream)  Final Report (08-20-22 @ 19:43):    No growth    Culture - Blood (collected 08-18-22 @ 17:29)  Source: .Blood Blood  Gram Stain (08-19-22 @ 11:05):    Growth in anaerobic bottle: Gram Negative Rods    Growth in aerobic bottle: Gram Negative Rods  Final Report (08-22-22 @ 09:10):    Growth in aerobic and anaerobic bottles: Escherichia coli ESBL      ***Blood Panel PCR results on this specimen are available    approximately 3 hours after the Gram stain result.***    Gram stain, PCR, and/or culture results may not always    correspond dueto difference in methodologies.    ************************************************************    This PCR assay was performed by multiplex PCR. This    Assay tests for 66 bacterial and resistance gene targets.    Please refer to the [facility] test directory    at [email or web address] for details.    Organism: Blood Culture PCR  Escherichia coli ESBL (08-22-22 @ 09:10)  Organism: Escherichia coli ESBL (08-22-22 @ 09:10)      -  Amikacin: S <=16      -  Ampicillin: R >16 These ampicillin results predict results for amoxicillin      -  Ampicillin/Sulbactam: R 8/4 Enterobacter, Klebsiella aerogenes, Citrobacter, and Serratia may develop resistance during prolonged therapy (3-4 days)      -  Aztreonam: R >16      -  Cefazolin: R >16 Enterobacter, Klebsiella aerogenes, Citrobacter, and Serratia may develop resistance during prolonged therapy (3-4 days)      -  Cefepime: R >16      -  Ceftriaxone: R >32 Enterobacter, Klebsiella aerogenes, Citrobacter, and Serratia may develop resistance during prolonged therapy      -  Ciprofloxacin: R >2      -  Ertapenem: S <=0.5      -  Gentamicin: R >8      -  Imipenem: S <=1      -  Levofloxacin: R >4      -  Meropenem: S <=1      -  Piperacillin/Tazobactam: R <=8      -  Tobramycin: R >8      -  Trimethoprim/Sulfamethoxazole: R >2/38      Method Type: KRISTINE  Organism: Blood Culture PCR (08-22-22 @ 09:10)      -  CTX-M Resistance Marker: Detec      -  ESBL: Detec      -  Escherichia coli: Detec      Method Type: PCR    Culture - Blood (collected 08-18-22 @ 17:29)  Source: .Blood Blood  Gram Stain (08-19-22 @ 13:11):    Growth in aerobic bottle: Gram Negative Rods    Growth in anaerobic bottle: Gram Negative Rods  Final Report (08-22-22 @ 09:54):    Growth in aerobic and anaerobic bottles: Escherichia coli ESBL    See previous culture [id number]    Culture - Urine (collected 08-18-22 @ 11:27)  Source: Clean Catch Clean Catch (Midstream)  Final Report (08-21-22 @ 14:20):    >100,000 CFU/ml Escherichia coli ESBL  Organism: Escherichia coli ESBL (08-21-22 @ 14:20)  Organism: Escherichia coli ESBL (08-21-22 @ 14:20)      -  Amikacin: S <=16      -  Amoxicillin/Clavulanic Acid: S <=8/4      -  Ampicillin: R >16 These ampicillin results predict results for amoxicillin      -  Ampicillin/Sulbactam: S 8/4 Enterobacter, Klebsiella aerogenes, Citrobacter, and Serratia may develop resistance during prolonged therapy (3-4 days)      -  Aztreonam: R >16      -  Cefazolin: R >16 (MIC_CL_COM_ENTERIC_CEFAZU) For uncomplicated UTI with K. pneumoniae, E. coli, or P. mirablis: KRISTINE <=16 is sensitive and KRISTINE >=32 is resistant. This also predicts results for oral agents cefaclor, cefdinir, cefpodoxime, cefprozil, cefuroxime axetil, cephalexin and locarbef for uncomplicated UTI. Note that some isolates may be susceptible to these agents while testing resistant to cefazolin.      -  Cefepime: R >16      -  Ceftriaxone: R >32 Enterobacter, Klebsiella aerogenes, Citrobacter, and Serratia may develop resistance during prolonged therapy      -  Ciprofloxacin: R >2      -  Ertapenem: S <=0.5      -  Gentamicin: R >8      -  Imipenem: S <=1      -  Levofloxacin: R >4      -  Meropenem: S <=1      -  Nitrofurantoin: S <=32 Should not be used to treat pyelonephritis      -  Piperacillin/Tazobactam: S <=8      -  Tigecycline: S <=2      -  Tobramycin: R >8      -  Trimethoprim/Sulfamethoxazole: S <=0.5/9.5      Method Type: KRISTINE    RADIOLOGY & ADDITIONAL TESTS:    < from: US Kidney and Bladder (08.20.22 @ 10:00) >  IMPRESSION:  1.  Unremarkable examination of bilateral kidneys.  2.  Urinary bladder not evaluated, as the patient has voided prior to the   examination.      < from: US Transvaginal (08.19.22 @ 20:25) >  IMPRESSION:  The uterus is heterogeneous with endometrium difficult to delineated,   however possibly abnormally thickened at 1.6 cm within a postmenopausal   patient. Outpatient MR abdomen with and without IV contrast may be   helpful following discharge for better assessment of endometrium and to   evaluate for potential abnormal endometrial thickening.    Left ovarian 2.7 cm simple cyst. Follow-up pelvic ultrasound in one year   recommended in postmenopausal patient.      < from: CT Abdomen and Pelvis w/ IV Cont (08.18.22 @ 15:09) >  FINDINGS:    LOWER CHEST: Mild bronchial wall thickening bilaterally. No focal   airspace consolidation.    HEPATOBILIARY: The liver is unremarkable. There are no radiopaque   gallstones. No biliary ductal dilatation..    SPLEEN: Unremarkable.  PANCREAS: Unremarkable.  ADRENAL GLANDS: Unremarkable.    KIDNEYS: There is mild urothelial thickening and enhancement of the renal   pelvises bilaterally. There is mildly heterogeneous left renal   enhancement and left perinephric stranding. No hydronephrosis. No renal   calculus. No perinephric abscess.    ABDOMINOPELVIC NODES: No abdominal or pelvic lymphadenopathy..    PELVIC ORGANS: 3 cm ovarian cyst.    PERITONEUM/MESENTERY/BOWEL: Colonic diverticulosis. No bowel obstruction.   No areas of bowel wall thickening. Small pelvic free fluid. No free air..    VASCULATURE: No abdominal aortic aneurysm. Mild calcified plaque in the   abdominal aorta.    BONES/SOFT TISSUES: Degenerative changes.    IMPRESSION:  1.  Findings suggest bilateral pyelitis and left pyelonephritis.  2.  3 cm left ovarian cyst. Follow-up pelvic ultrasound is recommended.  3.  Mild large airways inflammation noted at the lung bases.    CARDIOLOGY TESTING  12 Lead ECG:   Ventricular Rate 62 BPM  Atrial Rate 62 BPM  P-R Interval 154 ms  QRS Duration 88 ms  Q-T Interval 422 ms  QTC Calculation(Bazett) 428 ms  P Axis 52 degrees  R Axis 23 degrees  T Axis 40 degrees    Diagnosis Line Normal sinus rhythm  Low voltage QRS  Septal infarct , age undetermined  Abnormal ECG    Confirmed by Coleman Agustin ([id number]) on 8/18/2022 1:42:39 PM (08-18-22 @ 12:05)      MEDICATIONS  aspirin enteric coated 81  atorvastatin 20  cholecalciferol 1000  enoxaparin Injectable 40  iron sucrose IVPB 200  lactobacillus acidophilus 1  lidocaine   4% Patch 1  losartan 100  potassium chloride Tablet ER 40  psyllium Powder 1    ANTIBIOTICS:  Meropenem 1mg IV q8h

## 2022-08-22 NOTE — CONSULT NOTE ADULT - ASSESSMENT
Impression    Pt is a 77 y/o F with a PMH of multiple UTIs who presented on 8/18 with fever, nausea, and b/l flank pain. UA positive for large LE, +WBCs, moderate bacteria. Urine and blood cultures were positive for ESBL E. coli. Dx is pyelonephritis w/ E. coli bacteremia found to be ESBL sensitive only to carbapenems and amikacin. Pt received Rocephin in ED, Cefepime 8/18-8/21, and started on Meropenum on 8/21.     RECOMMENDATIONS (resident note, pending attending approval)  - C/w meropenum 1g q8h for total of 14 days (8/21-9/4)  - Pt will need midline for IV Abx at home  - IV hydration  - F/u blood, urine cultures Impression    Pt is a 75 y/o F with a PMH of multiple UTIs who presented on 8/18 with fever, nausea, and b/l flank pain. UA positive for large LE, +WBCs, moderate bacteria. Urine and blood cultures were positive for ESBL E. coli. Dx is pyelonephritis w/ E. coli bacteremia found to be ESBL sensitive only to carbapenems and amikacin. Pt received Rocephin in ED, Cefepime 8/18-8/21, and started on Meropenum on 8/21.     RECOMMENDATIONS   - C/w a carbapenem for total of 7 days of treatment (8/21-8/27)  - At discharge can change to ertapenem 1g IV q24h  - Pt will need midline for IV Abx at home  - Keep well hydrated  - F/u blood, urine cultures

## 2022-08-22 NOTE — PROGRESS NOTE ADULT - ASSESSMENT
----------------------------------------------------  # DVT prophylaxis:   # GI prophylaxis:   # Diet:   # Activity:   # Code status:   # Disposition:                                                                            --------------------------------------------------------    # Handoff:      Belarusian speaking 77 y/o woman w PMHx of HTN, HLD, DM, h/o UTIs, OA R knee, presented to ED on 8/18/22 for b/l flank pain w nausea and f/c x3days, admitted for b/l pyelitis and L pyelonephritis. Abx: CTX-> Cefepime -> Daja -> d/c on Erta for total 7days.     #Complicated UTI/ acute L pyelo/ ESBL bacteremia   BCx drawn 8/18/22 growing ESBL. No sepsis on admission, did not require pressors.   - Cefepime 2g q12h for 4 days 8/18-8/21/22, subsequent change to Meropenem starting 8/21/22 - plan for dc home on Ertapenem thru 8/27/22 for total 7 days through midline.   - CT Abd/Pelvis w b/l pyelitis and L pyelo, w/o hydro. U/S kidney and bladder on 8/20/22 unremarkable  - Midline placed; will make arrangements for home infusion   - D/c IV fluids, monitor urine  - Tylenol PRN for fever, Lidoderm patch PRN     #Microcytic anemia / Iron deficiency   - ?IV Venofer 200 mg Q 24 hours for 3 days   - Monitor H/H, keep Hb above 7.5 - Hb 9.7 on 9/22/22    #GYN abnormalities: ?Uterine thickening, L ovarian cyst  TVUS on 8/19/22 noted "possibly abnormally thickened at 1.6 cm within a postmenopausal patient. Outpatient MR abdomen with and without IV contrast may be helpful following discharge  - Should f/u w GYN for repeat TVUS and potentially MR abd w/wo contrast  - Pt w/o any current vaginal bleeding    #HTN: Cont Losartan 100mg QD hours with parameters for BP   #HLD: Lipid panel notable only for HDL 49. On statin  #DM2: Carb consistent diet, monitor fs, Insulin sliding scale while inpatient.   #OA: Tylenol/naproxen for pain                                                                                ----------------------------------------------------  # DVT prophylaxis: Lovenox 40mg SQ   # GI prophylaxis: N/A  # Diet: DASH/TLC, carb consistent   # Activity: Ambulatory  # Code status: FULL CODE   # Disposition: Home w midline for IV ertapenem                                                                           --------------------------------------------------------    # Handoff:   ?IV venofer  ?Lovenox

## 2022-08-22 NOTE — CONSULT NOTE ADULT - ATTENDING COMMENTS
75 y/o woman with a PMH of multiple UTIs (one prior this year in June), DM, HTN, OA of R knee who presented on 8/18 with fever and chills for 2 days found to have pyelonephritis    Impression  #Pyelonephritis complicated with ESBL E coli BSI     Recommendations  - switch to ertapenem on discharge (end date 8/28)    Please call or message on [employer] Teams if with any questions.  Spectra [phone]

## 2022-08-22 NOTE — PROGRESS NOTE ADULT - ASSESSMENT
75 yo woman pmhx dm, htn, hld, OA, multiple UTIs presents w/ +diana UA, BL CVA tenderness, was diagnosed with pyelonephritis, gram negative bacteremia.       A/P   #  Complicated UTI/ acute pyelonephritis/ ESBL  bacteremia   - c/w  Meropenem for 14 days total,  ID consult appreciated   - put midline, will make arrangements for home infusion   - d/c IV fluids   - kidney and bladder US noted   - monitor urine output  - no evidence of sepsis on admission   - Tylenol PRN for fever   - Lidoderm patch to right flank     # microcytic anemia / Iron deficiency   - on  IV Venofer 200 mg Q 24 hours for 3 days   - monitor H/H, keep Hb above 7.5       # HTN  - c/w  losartan 100 mg Q 24 hours with parameters for BP       # DM  - carb consistent diet   - monitor finger sticks   - Insulin coverage while in the hospital     # OA  - tylenol/naproxen for pain    # HLD  -  lipid panel noted   - on statin    # Ovarian cyst  - will get transvaginal US       DVT lovenox  Diet dash/tlc cc  GI on  famotidine    #Progress Note Handoff  Pending (specify):  d/c IV fluids, put midline in , make arrangements for home infusion, anticipate discharge in 24 hours   Family discussion: I spoke with pt and her family at the bedside, they agreed with a plan of care   Disposition: Home__x_/SNF___/Other________/Unknown at this time________

## 2022-08-23 ENCOUNTER — TRANSCRIPTION ENCOUNTER (OUTPATIENT)
Age: 77
End: 2022-08-23

## 2022-08-23 LAB
ALBUMIN SERPL ELPH-MCNC: 3.3 G/DL — LOW (ref 3.5–5.2)
ALP SERPL-CCNC: 147 U/L — HIGH (ref 30–115)
ALT FLD-CCNC: 26 U/L — SIGNIFICANT CHANGE UP (ref 0–41)
ANION GAP SERPL CALC-SCNC: 9 MMOL/L — SIGNIFICANT CHANGE UP (ref 7–14)
AST SERPL-CCNC: 24 U/L — SIGNIFICANT CHANGE UP (ref 0–41)
BASOPHILS # BLD AUTO: 0.03 K/UL — SIGNIFICANT CHANGE UP (ref 0–0.2)
BASOPHILS NFR BLD AUTO: 0.5 % — SIGNIFICANT CHANGE UP (ref 0–1)
BILIRUB SERPL-MCNC: 0.3 MG/DL — SIGNIFICANT CHANGE UP (ref 0.2–1.2)
BUN SERPL-MCNC: 7 MG/DL — LOW (ref 10–20)
CALCIUM SERPL-MCNC: 8.8 MG/DL — SIGNIFICANT CHANGE UP (ref 8.5–10.1)
CHLORIDE SERPL-SCNC: 106 MMOL/L — SIGNIFICANT CHANGE UP (ref 98–110)
CO2 SERPL-SCNC: 26 MMOL/L — SIGNIFICANT CHANGE UP (ref 17–32)
CREAT SERPL-MCNC: 0.6 MG/DL — LOW (ref 0.7–1.5)
EGFR: 92 ML/MIN/1.73M2 — SIGNIFICANT CHANGE UP
EOSINOPHIL # BLD AUTO: 0.25 K/UL — SIGNIFICANT CHANGE UP (ref 0–0.7)
EOSINOPHIL NFR BLD AUTO: 3.8 % — SIGNIFICANT CHANGE UP (ref 0–8)
GLUCOSE BLDC GLUCOMTR-MCNC: 123 MG/DL — HIGH (ref 70–99)
GLUCOSE SERPL-MCNC: 87 MG/DL — SIGNIFICANT CHANGE UP (ref 70–99)
HCT VFR BLD CALC: 27.2 % — LOW (ref 37–47)
HGB BLD-MCNC: 8.9 G/DL — LOW (ref 12–16)
IMM GRANULOCYTES NFR BLD AUTO: 4.2 % — HIGH (ref 0.1–0.3)
LYMPHOCYTES # BLD AUTO: 1.76 K/UL — SIGNIFICANT CHANGE UP (ref 1.2–3.4)
LYMPHOCYTES # BLD AUTO: 26.6 % — SIGNIFICANT CHANGE UP (ref 20.5–51.1)
MAGNESIUM SERPL-MCNC: 1.8 MG/DL — SIGNIFICANT CHANGE UP (ref 1.8–2.4)
MCHC RBC-ENTMCNC: 25.7 PG — LOW (ref 27–31)
MCHC RBC-ENTMCNC: 32.7 G/DL — SIGNIFICANT CHANGE UP (ref 32–37)
MCV RBC AUTO: 78.6 FL — LOW (ref 81–99)
MONOCYTES # BLD AUTO: 0.64 K/UL — HIGH (ref 0.1–0.6)
MONOCYTES NFR BLD AUTO: 9.7 % — HIGH (ref 1.7–9.3)
NEUTROPHILS # BLD AUTO: 3.66 K/UL — SIGNIFICANT CHANGE UP (ref 1.4–6.5)
NEUTROPHILS NFR BLD AUTO: 55.2 % — SIGNIFICANT CHANGE UP (ref 42.2–75.2)
NRBC # BLD: 0 /100 WBCS — SIGNIFICANT CHANGE UP (ref 0–0)
PHOSPHATE SERPL-MCNC: 2.7 MG/DL — SIGNIFICANT CHANGE UP (ref 2.1–4.9)
PLATELET # BLD AUTO: 255 K/UL — SIGNIFICANT CHANGE UP (ref 130–400)
POTASSIUM SERPL-MCNC: 4.4 MMOL/L — SIGNIFICANT CHANGE UP (ref 3.5–5)
POTASSIUM SERPL-SCNC: 4.4 MMOL/L — SIGNIFICANT CHANGE UP (ref 3.5–5)
PROT SERPL-MCNC: 5.4 G/DL — LOW (ref 6–8)
RBC # BLD: 3.46 M/UL — LOW (ref 4.2–5.4)
RBC # FLD: 15.5 % — HIGH (ref 11.5–14.5)
SODIUM SERPL-SCNC: 141 MMOL/L — SIGNIFICANT CHANGE UP (ref 135–146)
WBC # BLD: 6.62 K/UL — SIGNIFICANT CHANGE UP (ref 4.8–10.8)
WBC # FLD AUTO: 6.62 K/UL — SIGNIFICANT CHANGE UP (ref 4.8–10.8)

## 2022-08-23 PROCEDURE — 99233 SBSQ HOSP IP/OBS HIGH 50: CPT

## 2022-08-23 RX ORDER — ACETAMINOPHEN 500 MG
2 TABLET ORAL
Qty: 0 | Refills: 0 | DISCHARGE
Start: 2022-08-23

## 2022-08-23 RX ORDER — LOSARTAN/HYDROCHLOROTHIAZIDE 100MG-25MG
1 TABLET ORAL
Qty: 0 | Refills: 0 | DISCHARGE

## 2022-08-23 RX ORDER — LOSARTAN POTASSIUM 100 MG/1
1 TABLET, FILM COATED ORAL
Qty: 30 | Refills: 1
Start: 2022-08-23 | End: 2022-10-21

## 2022-08-23 RX ORDER — ERTAPENEM SODIUM 1 G/1
1 INJECTION, POWDER, LYOPHILIZED, FOR SOLUTION INTRAMUSCULAR; INTRAVENOUS
Qty: 0 | Refills: 0 | DISCHARGE

## 2022-08-23 RX ADMIN — LIDOCAINE 1 PATCH: 4 CREAM TOPICAL at 11:05

## 2022-08-23 RX ADMIN — LIDOCAINE 1 PATCH: 4 CREAM TOPICAL at 20:32

## 2022-08-23 RX ADMIN — MEROPENEM 100 MILLIGRAM(S): 1 INJECTION INTRAVENOUS at 22:19

## 2022-08-23 RX ADMIN — LOSARTAN POTASSIUM 100 MILLIGRAM(S): 100 TABLET, FILM COATED ORAL at 05:51

## 2022-08-23 RX ADMIN — ATORVASTATIN CALCIUM 20 MILLIGRAM(S): 80 TABLET, FILM COATED ORAL at 22:19

## 2022-08-23 RX ADMIN — Medication 1 TABLET(S): at 11:01

## 2022-08-23 RX ADMIN — LIDOCAINE 1 PATCH: 4 CREAM TOPICAL at 23:05

## 2022-08-23 RX ADMIN — Medication 1 PACKET(S): at 11:01

## 2022-08-23 RX ADMIN — MEROPENEM 100 MILLIGRAM(S): 1 INJECTION INTRAVENOUS at 13:06

## 2022-08-23 RX ADMIN — ENOXAPARIN SODIUM 40 MILLIGRAM(S): 100 INJECTION SUBCUTANEOUS at 17:01

## 2022-08-23 RX ADMIN — Medication 1000 UNIT(S): at 11:01

## 2022-08-23 RX ADMIN — MEROPENEM 100 MILLIGRAM(S): 1 INJECTION INTRAVENOUS at 05:50

## 2022-08-23 RX ADMIN — Medication 81 MILLIGRAM(S): at 11:01

## 2022-08-23 NOTE — DISCHARGE NOTE PROVIDER - NSDCMRMEDTOKEN_GEN_ALL_CORE_FT
acetaminophen 325 mg oral tablet: 2 tab(s) orally every 6 hours, As needed, Temp greater or equal to 38C (100.4F), Mild Pain (1 - 3)  Acidophilus oral tablet: 2 tab(s) orally once a day  aspirin 81 mg oral tablet: 1 tab(s) orally once a day  atorvastatin 20 mg oral tablet: 1 tab(s) orally once a day  cholecalciferol 25 mcg (1000 intl units) oral tablet: 1 tab(s) orally once a day  Cranberry oral tablet: 1 tab(s) orally once a day  diclofenac 1% topical gel: Apply topically to affected area once a day, As Needed  ertapenem 1 g injection: 1 gram(s) injectable once a day, end date 8/28/2022  glucosamine-chondroitin 500 mg-400 mg oral tablet: 1 tab(s) orally once a day  losartan-hydrochlorothiazide 100 mg-25 mg oral tablet: 1 tab(s) orally once a day  metFORMIN 500 mg oral tablet: 1 tab(s) orally 2 times a day  psyllium 50% oral powder for reconstitution: 1 application orally once a day   acetaminophen 325 mg oral tablet: 2 tab(s) orally every 6 hours, As needed, Temp greater or equal to 38C (100.4F), Mild Pain (1 - 3)  Acidophilus oral tablet: 2 tab(s) orally once a day  aspirin 81 mg oral tablet: 1 tab(s) orally once a day  atorvastatin 20 mg oral tablet: 1 tab(s) orally once a day  cholecalciferol 25 mcg (1000 intl units) oral tablet: 1 tab(s) orally once a day  Cranberry oral tablet: 1 tab(s) orally once a day  diclofenac 1% topical gel: Apply topically to affected area once a day, As Needed  ertapenem 1 g injection: 1 gram(s) injectable once a day, end date 8/28/2022  glucosamine-chondroitin 500 mg-400 mg oral tablet: 1 tab(s) orally once a day  losartan 100 mg oral tablet: 1 tab(s) orally once a day   metFORMIN 500 mg oral tablet: 1 tab(s) orally 2 times a day  psyllium 50% oral powder for reconstitution: 1 application orally once a day

## 2022-08-23 NOTE — DISCHARGE NOTE NURSING/CASE MANAGEMENT/SOCIAL WORK - PATIENT PORTAL LINK FT
You can access the FollowMyHealth Patient Portal offered by [facility] by registering at the following website: [email or web address]. By joining [employer]’s FollowMyHealth portal, you will also be able to view your health information using other applications (apps) compatible with our system.

## 2022-08-23 NOTE — DISCHARGE NOTE PROVIDER - COLLABORATE WITH
History of Present Illness


Consult date: 05/13/17


History of present illness: 


This is a 66-year-old female who apparently was brought from a extended care 

facility because of altered mental status.  She does have history of dementia 

and some some Issues.  Also had a history of some sepsis in the past.  

Apparently she is diagnosed to have possible UTI.  EKG showed sinus tachycardia 

on admission with nonspecific ST-T changes.  Her cardiac enzymes showed a 

mildly elevated troponin but the pattern is not consistent with acute coronary 

injury.  Patient  total CPK was 54.  The adductor the time of my examination 

patient is awake but unable to give any meaningful history.  She doesn't seem 

to be in acute distress.  She was claiming that she is feeling okay.  Computed 

tomography scan of the chest did not reveal any pulmonary emboli








Review of Systems





As per the chart





Past Medical History


Past Medical History: COPD, CVA/TIA, Diabetes Mellitus, Hypertension, Memory 

Impairment


Additional Past Medical History / Comment(s): UTI, multiple sclerosis, chronic 

constipation, abdominal distention


History of Any Multi-Drug Resistant Organisms: ESBL


Date of last positivie culture/infection: 9/20/16


MDRO Source:: Urine AND BLOOD-E.coli ESBL


Past Surgical History: Adenoidectomy, Tonsillectomy


Additional Past Surgical History / Comment(s): right leg ORIF, multiple scopes


Past Psychological History: No Psychological Hx Reported


Additional Psychological History / Comment(s): resident ECF, hx of ESBL


Smoking Status: Former smoker


Past Alcohol Use History: None Reported


Past Drug Use History: None Reported





- Past Family History


  ** Mother


History Unknown: Yes





  ** Father


History Unknown: Yes





Medications and Allergies


 Home Medications











 Medication  Instructions  Recorded  Confirmed  Type


 


Acetaminophen [Tylenol] 650 mg PO Q4H PRN 01/31/15 05/12/17 History


 


Amitriptyline HCl 25 mg PO HS@2100 01/31/15 05/12/17 History


 


Docusate Sodium [Dulcolax Stool 200 mg PO DAILY@0900 01/31/15 05/12/17 History





Softener]    


 


Dronabinol 10 mg PO BID@0900,2100 01/31/15 05/12/17 History


 


Ergocalciferol [Vitamin D2 50,000 unit PO SA 01/31/15 05/12/17 History





(DRISDOL)]    


 


Escitalopram [Lexapro] 20 mg PO DAILY@0900 01/31/15 05/12/17 History


 


Insulin Glargine [Lantus] 30 unit SQ DAILY@0900 01/31/15 05/12/17 History


 


Levothyroxine Sodium [Synthroid] 75 mcg PO DAILY@0600 01/31/15 05/12/17 History


 


Lisinopril [Prinivil] 20 mg PO DAILY@0900 01/31/15 05/12/17 History


 


Melatonin 3 mg PO HS PRN 01/31/15 05/12/17 History


 


Methenamine Hippurate 1 gm PO BID@0900,2100 01/31/15 05/12/17 History


 


Omeprazole [PriLOSEC] 20 mg PO DAILY@0600 01/31/15 05/12/17 History


 


Polyethylene Glycol 3350 [Miralax] 17 gm PO BID@0900,2100 01/31/15 05/12/17 

History


 


Sennosides [Senokot] 17.2 mg PO DAILY@0900 01/31/15 05/12/17 History


 


Artificial Tears-Hypromellose 1 drop BOTH EYES BID PRN 09/20/16 05/12/17 History





[Artificial Tear Drops]    


 


Atorvastatin [Lipitor] 20 mg PO HS@2100 09/20/16 05/12/17 History


 


Bisacodyl 10 mg RECTAL DAILY PRN 09/20/16 05/12/17 History


 


Calcium Carbonate [Calcium] 600 mg PO BID@0900,2100 09/20/16 05/12/17 History


 


Fenofibrate Nanocrystallized 160 mg PO HS@2100 09/20/16 05/12/17 History





[Tricor]    


 


Glucagen Hypokit Solution  1 mg IM Q10M PRN 09/20/16 05/12/17 History


 


Inzo Barrier Cream 1 applic TOPICAL BID 09/20/16 05/12/17 History


 


Magnesium Citrate [Citrate of 300 ml PO Q72H PRN 09/20/16 05/12/17 History





Magnesia]    


 


Naloxegol Oxalate [Movantik] 25 mg PO HS@2100 09/20/16 05/12/17 History


 


Antifungal Powder 1 applic TOPICAL DAILY 05/12/17 05/12/17 History


 


Insulin Aspart [NovoLOG Flexpen] See Protocol SQ ACHS 05/12/17 05/12/17 History


 


amLODIPine [Norvasc] 5 mg PO DAILY@0900 05/12/17 05/12/17 History











 Allergies











Allergy/AdvReac Type Severity Reaction Status Date / Time


 


No Known Allergies Allergy   Verified 05/12/17 14:39














Physical Exam


Vitals: 


 Vital Signs











  Temp Pulse Resp BP Pulse Ox


 


 05/13/17 06:47   73  16  176/92  98


 


 05/13/17 04:28   69  16  177/82  97


 


 05/13/17 01:38   96  16  169/75  97


 


 05/13/17 00:20   98  16  165/79  97


 


 05/12/17 23:10   92  16  180/86  97


 


 05/12/17 19:36   89  16  160/73  97


 


 05/12/17 19:12   95  18  149/81  96


 


 05/12/17 18:45   106 H  18  179/80  96


 


 05/12/17 17:04  99.9 F H  126 H  16  185/84  99


 


 05/12/17 17:03   128 H  16  172/85  96








 Intake and Output











 05/12/17 05/13/17 05/13/17





 22:59 06:59 14:59


 


Intake Total  242.87 


 


Balance  242.87 


 


Intake:   


 


  Intake, IV Titration  242.87 





  Amount   


 


    Heparin Sodium,Porcine/  242.87 





    D5w Pmx 25,000 unit In   





    Dextrose/Water 1 500ml.   





    bag @ 12 UNITS/KG/HR 17.   





    41 mls/hr IV .Q24H [facility]   





    #:[id number]   














GENERAL EXAM: Patient is awake, but this seemed to be disoriented.  In no acute 

distress


HEENT: Normocephalic. Normal reaction of pupils, equal size, normal range of 

extraocular motion. No erythema or exudates in the throat.


NECK: No masses, no nuchal rigidity.


CHEST: No chest wall deformity.


LUNGS: Equal air entry with no crackles or wheeze.


HEART: S1 and S2 normal with no audible mumurs or gallops. Regular rhythm, 

femorals equal on both sides..


ABDOMEN: No hepatosplenomegaly, normal bowel sounds, no guarding or rigidity.


SKIN: No rashes


CENTRAL NERVOUS SYSTEM: No focal deficits.


EXTREMITIES: No cyanosis, clubbing or edema.





Results





 05/13/17 02:40





 05/12/17 14:20


 Cardiac Enzymes











  05/12/17 05/13/17 Range/Units





  20:50 02:40 


 


CK-MB (CK-2)  2.9 H*  2.9 H*  (0.0-2.4)  ng/mL


 


Troponin I  0.369 H*  0.244 H*  (0.000-0.034)  ng/mL








 Coagulation











  05/12/17 05/13/17 Range/Units





  22:45 02:40 


 


APTT  60.6 H  91.9 H  (22.0-30.0)  sec








 Lipids











  05/13/17 Range/Units





  02:40 


 


Triglycerides  205 H  (<150)  mg/dL


 


Cholesterol  126  (<200)  mg/dL


 


HDL Cholesterol  51  (40-60)  mg/dL








 CBC











  05/13/17 Range/Units





  02:40 


 


Plt Count  314  (150-450)  k/uL








 Current Medications











Generic Name Dose Route Start Last Admin





  Trade Name Freq  PRN Reason Stop Dose Admin


 


Aspirin  325 mg  05/13/17 09:00  





  Aspirin  PO   





  DAILY [facility]   


 


Atorvastatin Calcium  80 mg  05/13/17 09:00  





  Lipitor  PO   





  DAILY [facility]   


 


Heparin Sodium (Porcine)  0 unit  05/12/17 16:38  





  Heparin  IV   





  Q6HR PRN   





  Low PTT   





  Protocol   


 


Heparin Sodium/Dextrose 25,000  500 mls @ 17.41 mls/hr  05/12/17 16:45  05/13/ 17 06:50





  unit/ IV Solution  IV   10 units/kg/hr





  .Q24H ANUJA   14.51 mls/hr





  Protocol   Titration





  12 UNITS/KG/HR   


 


Sodium Chloride  1,000 mls @ 100 mls/hr  05/12/17 16:45  05/13/17 06:54





  Saline 0.9%  IV   100 mls/hr





  .Q10H ANUJA   Administration


 


Metoprolol Tartrate  50 mg  05/12/17 21:00  05/12/17 23:41





  Lopressor  PO   50 mg





  BID ANUJA   Administration


 


Miscellaneous Information  1 each  05/12/17 16:50  





  Rx Info: Iv Contrast Was Given  MISCELLANE  05/14/17 16:50  





  DAILY PRN   





  Per Protocol   


 


Morphine Sulfate  4 mg  05/12/17 16:38  05/12/17 23:40





  Morphine Sulfate (Inj)  IV   4 mg





  Q4HR PRN   Administration





  Chest Pain   


 


Nitroglycerin  0.4 mg  05/12/17 16:38  





  Nitrostat  SUBLINGUAL   





  Q5M PRN   





  Chest Pain   








 Intake and Output











 05/12/17 05/13/17 05/13/17





 22:59 06:59 14:59


 


Intake Total  242.87 


 


Balance  242.87 


 


Intake:   


 


  Intake, IV Titration  242.87 





  Amount   


 


    Heparin Sodium,Porcine/  242.87 





    D5w Pmx 25,000 unit In   





    Dextrose/Water 1 500ml.   





    bag @ 12 UNITS/KG/HR 17.   





    41 mls/hr IV .Q24H [facility]   





    #:[id number]   








 





 05/13/17 02:40 











EKG Interpretations (text)





Sinus tachycardia with nonspecific ST-T abnormalities





Assessment and Plan


(1) Elevated troponin


Status: Acute   





(2) Altered mental status


Status: Acute   





(3) Urinary tract infection


Status: Acute   


Plan: 


Patient seemed to be stable at this time.  The mom troponins are mildly elevated

, this pattern is not consistent with acute coronary syndrome.  Underlying 

ischemic heart disease cannot be ruled out.  I will obtain an echocardiogram to 

assess LV function.  If the LV looks normal and patient doesn't have any 

symptoms, I would recommend conservative management from cardiac standpoint.  I 

don't think patient is a candidate for invasive workup.  Thank you again for 

letting us participate in the care of this patient.  We'll may add small dose 

of beta blocker and aspirin Resident

## 2022-08-23 NOTE — DISCHARGE NOTE NURSING/CASE MANAGEMENT/SOCIAL WORK - NSDCPEFALRISK_GEN_ALL_CORE
For information on Fall & Injury Prevention, visit: [email or web address] OR  [email or web address] OR  https://www.cdc.gov/steadi/patient.html

## 2022-08-23 NOTE — DISCHARGE NOTE PROVIDER - HOSPITAL COURSE
Delightful 77yo woman w/ pmhx htn, dm, multiple uti, OA R knee presents after 2 days of fever and chills associated w/ nausea and BL flank pain. Pt was in usual state of health until Tuesday when she became nauseous and couldn't tolerate PO diet associated w/ fever. Pt took motrin w/h helped, but then the fever and chills returned and this AM awoke w/ BL flank pain prompting trip to ED.  UA LEC+diana WBC 69 epithelial cells 1. CT abd suggestive of BL pyelitis and L pyelonephritis. Patient started on rocephin. UCx grew ESBL. Patient switched to meropenem, will be discharged on on ertapanem for total of 7 day course.

## 2022-08-23 NOTE — PROGRESS NOTE ADULT - ASSESSMENT
75 yo woman pmhx dm, htn, hld, OA, multiple UTIs presents w/ +diana UA, BL CVA tenderness, was diagnosed with pyelonephritis, gram negative bacteremia.       A/P   #  Complicated UTI/ acute pyelonephritis/ ESBL  bacteremia   - c/w  Meropenem for 7 days total,  ID consult appreciated   - midline is in , will make arrangements for home infusion    - kidney and bladder US noted   - monitor urine output  - no evidence of sepsis on admission   - Tylenol PRN for fever   - Lidoderm patch to right flank     # microcytic anemia / Iron deficiency   - on  IV Venofer 200 mg Q 24 hours for 3 days   - monitor H/H, keep Hb above 7.5     # HTN  - c/w  losartan 100 mg Q 24 hours with parameters for BP       # DM  - carb consistent diet   - monitor finger sticks   - Insulin coverage while in the hospital     # OA  - tylenol/naproxen for pain    # HLD  -  lipid panel noted   - on statin    # Ovarian cyst  - will get transvaginal US       DVT lovenox  Diet dash/tlc cc  GI on  famotidine    #Progress Note Handoff  Pending (specify):   make arrangements for home infusion, anticipate discharge this afternoon   Family discussion: I spoke with pt and her family at the bedside, they agreed with a plan of care   Disposition: Home__x_/SNF___/Other________/Unknown at this time________

## 2022-08-23 NOTE — DISCHARGE NOTE PROVIDER - NSDCCPCAREPLAN_GEN_ALL_CORE_FT
PRINCIPAL DISCHARGE DIAGNOSIS  Diagnosis: Pyelonephritis  Assessment and Plan of Treatment: You were admitted to the hospital due to bilateral pyelitis, and left  pyelonephritis, an infection of the kidney. Your urine cultures grew a resistant form of E. Coli. This will require 1 week of intravenous antibiotics. You will be discharge home to complete the remainder of the antibiotics.   Please follow up with your primary care doctor within 1 week of discharge. If you have reoccuring fever, chills, abdominal pain, pain with urination, or burning with urination, please return to the emergency department for re-evaluation.         SECONDARY DISCHARGE DIAGNOSES  Diagnosis: Pyelitis  Assessment and Plan of Treatment:      PRINCIPAL DISCHARGE DIAGNOSIS  Diagnosis: Pyelonephritis  Assessment and Plan of Treatment: You were admitted to the hospital and found to have bilateral pyelitis, and left  pyelonephritis, an infection of the kidney. Your urine cultures grew a resistant form of E. Coli. This will require 1 week of intravenous antibiotics. You were seen an evaluated by the infectious disease team. You will be discharge home to complete the remainder of the antibiotics.   Please follow up with your primary care doctor within 1 week of discharge. If you have reoccuring fever, chills, abdominal pain, pain with urination, or burning with urination, please return to the emergency department for re-evaluation.      SECONDARY DISCHARGE DIAGNOSES  Diagnosis: Pyelitis  Assessment and Plan of Treatment:      PRINCIPAL DISCHARGE DIAGNOSIS  Diagnosis: Pyelonephritis  Assessment and Plan of Treatment: You were admitted to the hospital and found to have bilateral pyelitis, and left  pyelonephritis, an infection of the kidney. Your urine cultures grew a resistant form of E. Coli. This will require 1 week of intravenous antibiotics. You were seen an evaluated by the infectious disease team. You will be discharge home to complete the remainder of the antibiotics. The end date of the antibiotics will be 8/28/22.    Please follow up with your primary care doctor within 1 week of discharge. If you have reoccuring fever, chills, abdominal pain, pain with urination, or burning with urination, please return to the emergency department for re-evaluation.      SECONDARY DISCHARGE DIAGNOSES  Diagnosis: Pyelitis  Assessment and Plan of Treatment:

## 2022-08-23 NOTE — PROGRESS NOTE ADULT - TIME BILLING
direct pt's care, communication with medical team, family , chart review
direct pt's care, communication with medical team, family , chart review
direct pt's care, communication with medical team, daughter, chart review
direct pt's care, communication with medical team, family , chart review
direct pt's care, communication with medical team, family , chart review

## 2022-08-23 NOTE — PROGRESS NOTE ADULT - SUBJECTIVE AND OBJECTIVE BOX
Patient is a 76y old  Female who presents with a chief complaint of flank pain, was admitted for complicated UTI/pyelonephritis, was found to have gram negative bacteremia.   Today pt  has no complaints, asking about discharge.     Vital Signs Last 24 Hrs  T(C): 36.5 (23 Aug 2022 05:07), Max: 37.3 (22 Aug 2022 14:41)  T(F): 97.7 (23 Aug 2022 05:07), Max: 99.2 (22 Aug 2022 14:41)  HR: 60 (23 Aug 2022 05:07) (60 - 70)  BP: 127/61 (23 Aug 2022 05:07) (119/58 - 173/72)  BP(mean): --  RR: 18 (23 Aug 2022 05:07) (18 - 18)      PHYSICAL EXAM:  GENERAL: NAD, well-groomed, well-developed  HEAD:  Atraumatic, Normocephalic  EYES: EOMI, PERRLA, conjunctiva and sclera clear  ENMT: No tonsillar erythema, exudates, or enlargement; Moist mucous membranes, Good dentition, No lesions  NECK: Supple, No JVD, Normal thyroid  NERVOUS SYSTEM:  Alert & Oriented X3, Good concentration; Motor Strength 5/5 B/L upper and lower extremities; DTRs 2+ intact and symmetric  CHEST/LUNG: Clear to percussion bilaterally; No rales, rhonchi, wheezing, or rubs  HEART: Regular rate and rhythm; No murmurs, rubs, or gallops  ABDOMEN: Soft, Nontender, Nondistended; Bowel sounds present, b/l flank tenderness   EXTREMITIES:  2+ Peripheral Pulses, No clubbing, cyanosis, or edema  LYMPH: No lymphadenopathy noted  SKIN: No rashes or lesions      LABS:                                   8.9    6.62  )-----------( 255      ( 23 Aug 2022 08:00 )             27.2   08-    141  |  106  |  7<L>  ----------------------------<  87  4.4   |  26  |  0.6<L>    Ca    8.8      23 Aug 2022 08:00  Phos  2.7     08-  Mg     1.8     08-23    TPro  5.4<L>  /  Alb  3.3<L>  /  TBili  0.3  /  DBili  x   /  AST  24  /  ALT  26  /  AlkPhos  147<H>        iroIron Total, Serum: 17 ug/dL (22 @ 07:42)     `  Urinalysis Basic - ( 18 Aug 2022 11:27 )    Color: Light Yellow / Appearance: Clear / S.009 / pH: x  Gluc: x / Ketone: Negative  / Bili: Negative / Urobili: <2 mg/dL   Blood: x / Protein: Trace / Nitrite: Negative   Leuk Esterase: Large / RBC: 5 /HPF / WBC 69 /HPF   Sq Epi: x / Non Sq Epi: 1 /HPF / Bacteria: Moderate        Culture - Blood (collected 18 Aug 2022 17:29)  Source: .Blood Blood  Gram Stain (19 Aug 2022 11:05):    Growth in anaerobic bottle: Gram Negative Rods    Growth in aerobic bottle: Gram Negative Rods  Preliminary Report (19 Aug 2022 11:05):    Growth in anaerobic bottle: Gram Negative Rods    Growth in aerobic bottle: Gram Negative Rods    ***Blood Panel PCR results on this specimen are available    approximately 3 hours after the Gram stain result.***    Gram stain, PCR, and/or culture results may not always    correspond due to difference in methodologies.    ************************************************************    This PCR assay was performed by multiplex PCR. This    Assay tests for 66 bacterial and resistance gene targets.    Please refer to the [facility] test directory    at [email or web address] for details.  Organism: Blood Culture PCR (19 Aug 2022 12:42)  Organism: Blood Culture PCR (19 Aug 2022 12:42)    Culture - Blood (collected 18 Aug 2022 17:29)  Source: .Blood Blood  Gram Stain (19 Aug 2022 13:11):    Growth in aerobic bottle: Gram Negative Rods    Growth in anaerobic bottle: Gram Negative Rods  Preliminary Report (19 Aug 2022 13:11):    Growth in aerobic bottle: Gram Negative Rods    Growth in anaerobic bottle: Gram Negative Rods    Culture - Blood (22 @ 21:42)   Specimen Source: .Blood None   Culture Results:   No growth to date. Culture - Urine (22 @ 13:30)   Specimen Source: Clean Catch Clean Catch (Midstream)   Culture Results:   No growth Culture - Blood (22 @ 17:29)   - ESBL: Detec   - Escherichia coli: Detec   - CTX-M Resistance Marker: Detec   Gram Stain:   Growth in anaerobic bottle: Gram Negative Rods   Growth in aerobic bottle: Gram Negative Rods   Specimen Source: .Blood Blood   Organism: Blood Culture PCR   RADIOLOGY & ADDITIONAL TESTS:    < from: CT Abdomen and Pelvis w/ IV Cont (22 @ 15:09) >  IMPRESSION:  1.  Findings suggest bilateral pyelitis and left pyelonephritis.  2.  3 cm left ovarian cyst. Follow-up pelvic ultrasound is recommended.  3.  Mild large airways inflammation noted at the lung bases.    < end of copied text >    < from: US Kidney and Bladder (22 @ 10:00) >  IMPRESSION:  1.  Unremarkable examination of bilateral kidneys.  2.  Urinary bladder not evaluated, as the patient has voided prior to the   examination.    < end of copied text >    MEDICATIONS  (STANDING):  aspirin enteric coated 81 milliGRAM(s) Oral daily  atorvastatin 20 milliGRAM(s) Oral at bedtime  cholecalciferol 1000 Unit(s) Oral daily  enoxaparin Injectable 40 milliGRAM(s) SubCutaneous every 24 hours  lactobacillus acidophilus 1 Tablet(s) Oral daily  lidocaine   4% Patch 1 Patch Transdermal daily  lidocaine   4% Patch 1 Patch Transdermal daily  losartan 100 milliGRAM(s) Oral daily  losartan      meropenem  IVPB 1000 milliGRAM(s) IV Intermittent every 8 hours  potassium chloride    Tablet ER 40 milliEquivalent(s) Oral once  psyllium Powder 1 Packet(s) Oral daily    MEDICATIONS  (PRN):  acetaminophen     Tablet .. 650 milliGRAM(s) Oral every 6 hours PRN Temp greater or equal to 38C (100.4F), Mild Pain (1 - 3)  aluminum hydroxide/magnesium hydroxide/simethicone Suspension 30 milliLiter(s) Oral every 4 hours PRN Dyspepsia  melatonin 3 milliGRAM(s) Oral at bedtime PRN Insomnia  naproxen 250 milliGRAM(s) Oral two times a day PRN Mild Pain (1 - 3)  ondansetron Injectable 4 milliGRAM(s) IV Push every 8 hours PRN Nausea and/or Vomiting

## 2022-08-24 VITALS
RESPIRATION RATE: 18 BRPM | DIASTOLIC BLOOD PRESSURE: 64 MMHG | SYSTOLIC BLOOD PRESSURE: 140 MMHG | HEART RATE: 60 BPM | TEMPERATURE: 98 F

## 2022-08-24 LAB
ALBUMIN SERPL ELPH-MCNC: 3.9 G/DL — SIGNIFICANT CHANGE UP (ref 3.5–5.2)
ALP SERPL-CCNC: 172 U/L — HIGH (ref 30–115)
ALT FLD-CCNC: 42 U/L — HIGH (ref 0–41)
ANION GAP SERPL CALC-SCNC: 10 MMOL/L — SIGNIFICANT CHANGE UP (ref 7–14)
AST SERPL-CCNC: 52 U/L — HIGH (ref 0–41)
BASOPHILS # BLD AUTO: 0.04 K/UL — SIGNIFICANT CHANGE UP (ref 0–0.2)
BASOPHILS NFR BLD AUTO: 0.5 % — SIGNIFICANT CHANGE UP (ref 0–1)
BILIRUB SERPL-MCNC: 0.4 MG/DL — SIGNIFICANT CHANGE UP (ref 0.2–1.2)
BUN SERPL-MCNC: 8 MG/DL — LOW (ref 10–20)
CALCIUM SERPL-MCNC: 9.5 MG/DL — SIGNIFICANT CHANGE UP (ref 8.5–10.1)
CHLORIDE SERPL-SCNC: 102 MMOL/L — SIGNIFICANT CHANGE UP (ref 98–110)
CO2 SERPL-SCNC: 26 MMOL/L — SIGNIFICANT CHANGE UP (ref 17–32)
CREAT SERPL-MCNC: 0.6 MG/DL — LOW (ref 0.7–1.5)
EGFR: 92 ML/MIN/1.73M2 — SIGNIFICANT CHANGE UP
EOSINOPHIL # BLD AUTO: 0.27 K/UL — SIGNIFICANT CHANGE UP (ref 0–0.7)
EOSINOPHIL NFR BLD AUTO: 3.6 % — SIGNIFICANT CHANGE UP (ref 0–8)
GLUCOSE SERPL-MCNC: 87 MG/DL — SIGNIFICANT CHANGE UP (ref 70–99)
HCT VFR BLD CALC: 31.4 % — LOW (ref 37–47)
HGB BLD-MCNC: 10.2 G/DL — LOW (ref 12–16)
IMM GRANULOCYTES NFR BLD AUTO: 4.5 % — HIGH (ref 0.1–0.3)
LYMPHOCYTES # BLD AUTO: 2.4 K/UL — SIGNIFICANT CHANGE UP (ref 1.2–3.4)
LYMPHOCYTES # BLD AUTO: 31.7 % — SIGNIFICANT CHANGE UP (ref 20.5–51.1)
MAGNESIUM SERPL-MCNC: 2 MG/DL — SIGNIFICANT CHANGE UP (ref 1.8–2.4)
MCHC RBC-ENTMCNC: 25.8 PG — LOW (ref 27–31)
MCHC RBC-ENTMCNC: 32.5 G/DL — SIGNIFICANT CHANGE UP (ref 32–37)
MCV RBC AUTO: 79.3 FL — LOW (ref 81–99)
MONOCYTES # BLD AUTO: 0.57 K/UL — SIGNIFICANT CHANGE UP (ref 0.1–0.6)
MONOCYTES NFR BLD AUTO: 7.5 % — SIGNIFICANT CHANGE UP (ref 1.7–9.3)
NEUTROPHILS # BLD AUTO: 3.95 K/UL — SIGNIFICANT CHANGE UP (ref 1.4–6.5)
NEUTROPHILS NFR BLD AUTO: 52.2 % — SIGNIFICANT CHANGE UP (ref 42.2–75.2)
NRBC # BLD: 0 /100 WBCS — SIGNIFICANT CHANGE UP (ref 0–0)
PHOSPHATE SERPL-MCNC: 3 MG/DL — SIGNIFICANT CHANGE UP (ref 2.1–4.9)
PLATELET # BLD AUTO: 357 K/UL — SIGNIFICANT CHANGE UP (ref 130–400)
POTASSIUM SERPL-MCNC: 5.1 MMOL/L — HIGH (ref 3.5–5)
POTASSIUM SERPL-SCNC: 5.1 MMOL/L — HIGH (ref 3.5–5)
PROT SERPL-MCNC: 6.3 G/DL — SIGNIFICANT CHANGE UP (ref 6–8)
RBC # BLD: 3.96 M/UL — LOW (ref 4.2–5.4)
RBC # FLD: 15.5 % — HIGH (ref 11.5–14.5)
SODIUM SERPL-SCNC: 138 MMOL/L — SIGNIFICANT CHANGE UP (ref 135–146)
WBC # BLD: 7.57 K/UL — SIGNIFICANT CHANGE UP (ref 4.8–10.8)
WBC # FLD AUTO: 7.57 K/UL — SIGNIFICANT CHANGE UP (ref 4.8–10.8)

## 2022-08-24 PROCEDURE — 99239 HOSP IP/OBS DSCHRG MGMT >30: CPT

## 2022-08-24 RX ORDER — ERTAPENEM SODIUM 1 G/1
1000 INJECTION, POWDER, LYOPHILIZED, FOR SOLUTION INTRAMUSCULAR; INTRAVENOUS EVERY 24 HOURS
Refills: 0 | Status: COMPLETED | OUTPATIENT
Start: 2022-08-24 | End: 2022-08-24

## 2022-08-24 RX ADMIN — ERTAPENEM SODIUM 120 MILLIGRAM(S): 1 INJECTION, POWDER, LYOPHILIZED, FOR SOLUTION INTRAMUSCULAR; INTRAVENOUS at 12:45

## 2022-08-24 RX ADMIN — LOSARTAN POTASSIUM 100 MILLIGRAM(S): 100 TABLET, FILM COATED ORAL at 05:53

## 2022-08-24 RX ADMIN — MEROPENEM 100 MILLIGRAM(S): 1 INJECTION INTRAVENOUS at 05:53

## 2022-08-24 RX ADMIN — Medication 1 TABLET(S): at 11:19

## 2022-08-24 RX ADMIN — Medication 1 PACKET(S): at 11:19

## 2022-08-24 RX ADMIN — Medication 1000 UNIT(S): at 11:19

## 2022-08-24 RX ADMIN — Medication 81 MILLIGRAM(S): at 11:19

## 2022-08-24 NOTE — PROGRESS NOTE ADULT - PROVIDER SPECIALTY LIST ADULT
Internal Medicine
Hospitalist
Hospitalist
Internal Medicine
Hospitalist
Internal Medicine
Hospitalist
Internal Medicine
Hospitalist

## 2022-08-24 NOTE — PROGRESS NOTE ADULT - SUBJECTIVE AND OBJECTIVE BOX
SREEKANTH NUÑEZ 76y Female  MRN#: [id number]   CODE STATUS: FULL CODE     Hospital Day: 5d    Pt is currently admitted with the primary diagnosis of b/l pyelitis and L pyelonephritis w ESBL bacteremia - most recent (+) BCx 8/18/22     SUBJECTIVE  Hospital Course  Malay speaking 77 y/o woman w PMHx of HTN, HLD, DM, h/o UTIs, OA R knee, presented to ED on 8/18/22 for b/l flank pain w nausea and f/c x3days. In ED had /64, HR 65, RR 18, 96% RA, T 99.1 w labs notable for WBC 9.68, Hg 11.5, K 3.4. UA w (+) LE, WBC 69, epithelial cells 1. CT abd suggestive of b/l pyelitis and L pyelonephritis. Received 2L LR and 1g CTX, admitted to medicine.     As inpatient has been on Cefepime 2g q12h for 4 days 8/18-8/21/22, w BCx drawn 8/18/22 growing ESBL and subsequent change to Meropenem starting 8/21/22 - plan for dc home on Ertapenem thru 8/27/22 for total 7 days through midline. U/S kidney and bladder on 8/20/22 unremarkable, bladder not evaluated. Also noted incidental 2.7cm L ovarian cyst w rec for 1 year f/u TVUS.     8/24/22 Day 5: Pt continuing to feel even more improved, feeling very ready to go home. Denies fevers, chills, dysuria, hematuria, back pain, abd pain, n/v.       Overnight events  None significant    Subjective complaints  Denies any lower abd tenderness/discomfort including the suprapubic area. Denies dysuria, fevers, chills. States overall feeling much improved.     PHYSICAL EXAM:  GENERAL: NAD, lying in bed comfortably  HEAD:  Atraumatic, Normocephalic  EYES: EOMI, sclera clear  ENT: Moist mucous membranes  NECK: Supple  CHEST/LUNG: Clear to auscultation anteriorly bilaterally; No rales, rhonchi, wheezing, or rubs. Unlabored respirations  HEART: Regular rate and rhythm; ?I/VI systolic murmur loudest at L sternal border.   ABDOMEN: (+)BS; Soft, nondistended. No TTP to lower abd, including suprapubic area. No rebound or guarding.   : (+) very mild R CVAT   EXTREMITIES:  2+ Peripheral Pulses, brisk capillary refill. No clubbing, cyanosis, or edema  NERVOUS SYSTEM:  A&Ox3, no focal deficits   SKIN: No rashes or lesions       Present Today:   - Leonard:  No [ X ], Yes [  ]    - Type of IV Access:       .. CVC/Piccline:  No [ X ], Yes [  ]       .. Midline: No [  ], Yes [ X ] : Indication: will be d/c on IV abx                                              OBJECTIVE  PAST MEDICAL & SURGICAL HISTORY  HTN (hypertension)                                                ALLERGIES:  Benadryl (Unknown)  sulfamethoxazole (Unknown)                           HOME MEDICATIONS  Home Medications:  Acidophilus oral tablet: 2 tab(s) orally once a day (18 Aug 2022 21:24)  aspirin 81 mg oral tablet: 1 tab(s) orally once a day (18 Aug 2022 21:25)  atorvastatin 20 mg oral tablet: 1 tab(s) orally once a day (18 Aug 2022 21:25)  cholecalciferol 25 mcg (1000 intl units) oral tablet: 1 tab(s) orally once a day (18 Aug 2022 21:26)  Cranberry oral tablet: 1 tab(s) orally once a day (18 Aug 2022 21:26)  diclofenac 1% topical gel: Apply topically to affected area once a day, As Needed (18 Aug 2022 21:27)  glucosamine-chondroitin 500 mg-400 mg oral tablet: 1 tab(s) orally once a day (18 Aug 2022 21:27)  losartan-hydrochlorothiazide 100 mg-25 mg oral tablet: 1 tab(s) orally once a day (18 Aug 2022 21:28)  metFORMIN 500 mg oral tablet: 1 tab(s) orally 2 times a day (18 Aug 2022 21:28)  psyllium 50% oral powder for reconstitution: 1 application orally once a day (18 Aug 2022 21:29)                           MEDICATIONS:  STANDING MEDICATIONS  aspirin enteric coated 81 milliGRAM(s) Oral daily  atorvastatin 20 milliGRAM(s) Oral at bedtime  cholecalciferol 1000 Unit(s) Oral daily  enoxaparin Injectable 40 milliGRAM(s) SubCutaneous every 24 hours  lactobacillus acidophilus 1 Tablet(s) Oral daily  lidocaine   4% Patch 1 Patch Transdermal daily  lidocaine   4% Patch 1 Patch Transdermal daily  losartan 100 milliGRAM(s) Oral daily  losartan      meropenem  IVPB 1000 milliGRAM(s) IV Intermittent every 8 hours  potassium chloride    Tablet ER 40 milliEquivalent(s) Oral once  psyllium Powder 1 Packet(s) Oral daily    PRN MEDICATIONS  acetaminophen     Tablet .. 650 milliGRAM(s) Oral every 6 hours PRN  aluminum hydroxide/magnesium hydroxide/simethicone Suspension 30 milliLiter(s) Oral every 4 hours PRN  melatonin 3 milliGRAM(s) Oral at bedtime PRN  naproxen 250 milliGRAM(s) Oral two times a day PRN  ondansetron Injectable 4 milliGRAM(s) IV Push every 8 hours PRN                                            ------------------------------------------------------------  VITAL SIGNS: Last 24 Hours  T(C): 36.5 (22 Aug 2022 19:32), Max: 37.3 (22 Aug 2022 14:41)  T(F): 97.7 (22 Aug 2022 19:32), Max: 99.2 (22 Aug 2022 14:41)  HR: 67 (22 Aug 2022 19:32) (63 - 70)  BP: 119/58 (22 Aug 2022 19:32) (119/58 - 173/72)  BP(mean): --  RR: 18 (22 Aug 2022 19:32) (18 - 20)  SpO2: --                                               LABS:                        9.7    5.68  )-----------( 282      ( 22 Aug 2022 07:13 )             30.0     08-22    141  |  104  |  8<L>  ----------------------------<  98  4.1   |  26  |  0.6<L>    Ca    8.9      22 Aug 2022 07:13  Mg     1.9     08-22    TPro  5.7<L>  /  Alb  3.5  /  TBili  0.3  /  DBili  x   /  AST  24  /  ALT  31  /  AlkPhos  168<H>  08-22                Culture - Blood (collected 21 Aug 2022 07:33)  Source: .Blood None  Preliminary Report (22 Aug 2022 17:02):    No growth to date.    Culture - Blood (collected 20 Aug 2022 07:42)  Source: .Blood None  Preliminary Report (21 Aug 2022 19:01):    No growth to date.    Culture - Blood (collected 19 Aug 2022 21:42)  Source: .Blood None  Preliminary Report (21 Aug 2022 02:01):    No growth to date.                                                    RADIOLOGY:    U/S Kidney Bladder 8/20/22:   1.  Unremarkable examination of bilateral kidneys.  2.  Urinary bladder not evaluated, as the patient has voided prior to the   examination.    TVUS 8/19/22:   The uterus is heterogeneous with endometrium difficult to delineated, however possibly abnormally thickened at 1.6 cm within a postmenopausal patient. Outpatient MR abdomen with and without IV contrast may be helpful following discharge for better assessment of endometrium and to evaluate for potential abnormal endometrial thickening.  Left ovarian 2.7 cm simple cyst. Follow-up pelvic ultrasound in one year recommended in postmenopausal patient.    CT Abd/Pelvis 8/18/22:   1.  Findings suggest bilateral pyelitis and left pyelonephritis. w/o hydro   2.  3 cm left ovarian cyst. Follow-up pelvic ultrasound is recommended.  3.  Mild large airways inflammation noted at the lung bases.  (+) diverticulosis

## 2022-08-24 NOTE — PROGRESS NOTE ADULT - ASSESSMENT
Slovak speaking 77 y/o woman w PMHx of HTN, HLD, DM, h/o UTIs, OA R knee, presented to ED on 8/18/22 for b/l flank pain w nausea and f/c x3days, admitted for b/l pyelitis and L pyelonephritis. Abx: CTX-> Cefepime -> Daja -> d/c on Erta for total 7days.     #Complicated UTI/ acute L pyelo/ ESBL bacteremia   BCx drawn 8/18/22 growing ESBL. No sepsis on admission, did not require pressors.   - Cefepime 2g q12h for 4 days 8/18-8/21/22, subsequent change to Meropenem starting 8/21/22 - plan for dc home on Ertapenem thru 8/28/22 for total 7 days through midline.   - CT Abd/Pelvis w b/l pyelitis and L pyelo, w/o hydro. U/S kidney and bladder on 8/20/22 unremarkable  - Midline placed; arrangements made for home infusion   - Off IV fluids, maintained good UOP   - Tylenol PRN for fever, Lidoderm patch PRN     #Microcytic anemia / Iron deficiency   - ?IV Venofer 200 mg Q 24 hours for 3 days   - Monitor H/H, keep Hb above 7.5 - Hb 10.2 on 9/24/22    #GYN abnormalities: ?Uterine thickening, L ovarian cyst  TVUS on 8/19/22 noted "possibly abnormally thickened at 1.6 cm within a postmenopausal patient. Outpatient MR abdomen with and without IV contrast may be helpful following discharge  - Should f/u w GYN for repeat TVUS and potentially MR abd w/wo contrast  - Pt w/o any current vaginal bleeding    #HTN: Cont Losartan 100mg QD hours with parameters for BP   #HLD: Lipid panel notable only for HDL 49. On statin  #DM2: Carb consistent diet, monitor fs, Insulin sliding scale while inpatient.   #OA: Tylenol/naproxen for pain                                                                                ----------------------------------------------------  # DVT prophylaxis: Lovenox 40mg SQ   # GI prophylaxis: N/A  # Diet: DASH/TLC, carb consistent   # Activity: Ambulatory  # Code status: FULL CODE   # Disposition: Home w midline for IV ertapenem                                                                           --------------------------------------------------------    # Handoff:   Discharge w midline today

## 2022-08-25 LAB
CULTURE RESULTS: SIGNIFICANT CHANGE UP
CULTURE RESULTS: SIGNIFICANT CHANGE UP
SPECIMEN SOURCE: SIGNIFICANT CHANGE UP
SPECIMEN SOURCE: SIGNIFICANT CHANGE UP

## 2022-08-26 LAB
CULTURE RESULTS: SIGNIFICANT CHANGE UP
SPECIMEN SOURCE: SIGNIFICANT CHANGE UP

## 2022-08-27 LAB
CULTURE RESULTS: SIGNIFICANT CHANGE UP
SPECIMEN SOURCE: SIGNIFICANT CHANGE UP

## 2022-08-29 ENCOUNTER — EMERGENCY (EMERGENCY)
Facility: HOSPITAL | Age: 77
LOS: 0 days | Discharge: HOME | End: 2022-08-29
Attending: EMERGENCY MEDICINE | Admitting: EMERGENCY MEDICINE

## 2022-08-29 VITALS
HEIGHT: 60 IN | HEART RATE: 81 BPM | OXYGEN SATURATION: 100 % | WEIGHT: 153 LBS | DIASTOLIC BLOOD PRESSURE: 74 MMHG | TEMPERATURE: 98 F | SYSTOLIC BLOOD PRESSURE: 172 MMHG | RESPIRATION RATE: 17 BRPM

## 2022-08-29 DIAGNOSIS — Z45.2 ENCOUNTER FOR ADJUSTMENT AND MANAGEMENT OF VASCULAR ACCESS DEVICE: ICD-10-CM

## 2022-08-29 DIAGNOSIS — I10 ESSENTIAL (PRIMARY) HYPERTENSION: ICD-10-CM

## 2022-08-29 DIAGNOSIS — Z87.448 PERSONAL HISTORY OF OTHER DISEASES OF URINARY SYSTEM: ICD-10-CM

## 2022-08-29 DIAGNOSIS — Z88.2 ALLERGY STATUS TO SULFONAMIDES: ICD-10-CM

## 2022-08-29 DIAGNOSIS — E11.9 TYPE 2 DIABETES MELLITUS WITHOUT COMPLICATIONS: ICD-10-CM

## 2022-08-29 DIAGNOSIS — Z79.82 LONG TERM (CURRENT) USE OF ASPIRIN: ICD-10-CM

## 2022-08-29 DIAGNOSIS — Z79.84 LONG TERM (CURRENT) USE OF ORAL HYPOGLYCEMIC DRUGS: ICD-10-CM

## 2022-08-29 DIAGNOSIS — Z88.9 ALLERGY STATUS TO UNSPECIFIED DRUGS, MEDICAMENTS AND BIOLOGICAL SUBSTANCES: ICD-10-CM

## 2022-08-29 PROCEDURE — 99283 EMERGENCY DEPT VISIT LOW MDM: CPT

## 2022-08-29 NOTE — ED PROVIDER NOTE - NS ED ROS FT
Constitutional: No fever, chills.  Eyes:  No visual changes  ENMT:  No neck pain  Cardiac:  No chest pain  Respiratory:  No cough, SOB  GI:  No nausea, vomiting, diarrhea, abdominal pain.  :  No dysuria, hematuria  MS:  No back pain.  Neuro:  No headache or lightheadedness  Skin:  No skin rash    Except as documented in the HPI,  all other systems are negative.

## 2022-08-29 NOTE — ED PROVIDER NOTE - PHYSICAL EXAMINATION
GENERAL: Well-nourished, Well-developed. NAD.  HEAD: No visible or palpable bumps or hematomas. No ecchymosis behind ears B/L.  Eyes: PERRLA, EOMI. No asymmetry. No nystagmus. No conjunctival injection. Non-icteric sclera.  ENMT: MMM.   Neck: Supple. FROM  CVS: RRR  Skin: (+)RUE midline in place, no surrounding warmth, erythema or discharge.

## 2022-08-29 NOTE — ED PROVIDER NOTE - PATIENT PORTAL LINK FT
You can access the FollowMyHealth Patient Portal offered by [facility] by registering at the following website: [email or web address]. By joining [employer]’s FollowMyHealth portal, you will also be able to view your health information using other applications (apps) compatible with our system.

## 2022-08-29 NOTE — ED PROVIDER NOTE - OBJECTIVE STATEMENT
76-year-old female past medical history hypertension, diabetes, presenting to the ED for removal of right upper extremity midline. Midline was placed on August 22, patient completed 7-day course of ertapenem. Patient reports she came to have midline removed since she completed the antibiotics. Denies any pain, erythema, warmth, fever, chills.

## 2022-08-29 NOTE — ED PROVIDER NOTE - NS ED ATTENDING STATEMENT MOD
This was a shared visit with the RACHELE. I reviewed and verified the documentation and independently performed the documented:

## 2022-08-29 NOTE — ED PROVIDER NOTE - NSTIMEPROVIDERCAREINITIATE_GEN_ER
Is This A New Presentation, Or A Follow-Up?: Warts How Severe Are Your Warts?: mild 29-Aug-2022 18:10

## 2022-08-29 NOTE — ED PROVIDER NOTE - NSFOLLOWUPINSTRUCTIONS_ED_ALL_ED_FT
**follow up with your doctor as instructed**    return if you develop redness, warmth, swelling, pain, fever, chills.

## 2022-08-29 NOTE — ED PROVIDER NOTE - ATTENDING APP SHARED VISIT CONTRIBUTION OF CARE
76-year-old female history of HTN, DM s/p course of intravenous antibiotics for urinary tract infection and now presents for right upper extremity midline removal, denies fever, purulent drainage, erythema, lymphangitis, evidence cellulitis, crepitus or other associated complaints at present. VSS, midline removed without complication.

## 2022-09-01 DIAGNOSIS — R10.9 UNSPECIFIED ABDOMINAL PAIN: ICD-10-CM

## 2022-09-01 DIAGNOSIS — R78.81 BACTEREMIA: ICD-10-CM

## 2022-09-01 DIAGNOSIS — M19.90 UNSPECIFIED OSTEOARTHRITIS, UNSPECIFIED SITE: ICD-10-CM

## 2022-09-01 DIAGNOSIS — E11.9 TYPE 2 DIABETES MELLITUS WITHOUT COMPLICATIONS: ICD-10-CM

## 2022-09-01 DIAGNOSIS — E78.5 HYPERLIPIDEMIA, UNSPECIFIED: ICD-10-CM

## 2022-09-01 DIAGNOSIS — I10 ESSENTIAL (PRIMARY) HYPERTENSION: ICD-10-CM

## 2022-09-01 DIAGNOSIS — N83.202 UNSPECIFIED OVARIAN CYST, LEFT SIDE: ICD-10-CM

## 2022-09-01 DIAGNOSIS — E87.6 HYPOKALEMIA: ICD-10-CM

## 2022-09-01 DIAGNOSIS — Z88.2 ALLERGY STATUS TO SULFONAMIDES: ICD-10-CM

## 2022-09-01 DIAGNOSIS — D50.9 IRON DEFICIENCY ANEMIA, UNSPECIFIED: ICD-10-CM

## 2022-09-01 DIAGNOSIS — N10 ACUTE PYELONEPHRITIS: ICD-10-CM

## 2022-09-01 DIAGNOSIS — B96.29 OTHER ESCHERICHIA COLI [E. COLI] AS THE CAUSE OF DISEASES CLASSIFIED ELSEWHERE: ICD-10-CM

## 2023-05-10 ENCOUNTER — EMERGENCY (EMERGENCY)
Facility: HOSPITAL | Age: 78
LOS: 0 days | Discharge: HOME | End: 2023-05-10
Attending: EMERGENCY MEDICINE
Payer: MEDICARE

## 2023-05-10 VITALS
DIASTOLIC BLOOD PRESSURE: 112 MMHG | OXYGEN SATURATION: 97 % | RESPIRATION RATE: 17 BRPM | TEMPERATURE: 98 F | SYSTOLIC BLOOD PRESSURE: 203 MMHG | HEIGHT: 62 IN | HEART RATE: 64 BPM | WEIGHT: 149.91 LBS

## 2023-05-10 VITALS
HEART RATE: 67 BPM | TEMPERATURE: 98 F | RESPIRATION RATE: 18 BRPM | OXYGEN SATURATION: 99 % | SYSTOLIC BLOOD PRESSURE: 186 MMHG | DIASTOLIC BLOOD PRESSURE: 76 MMHG

## 2023-05-10 DIAGNOSIS — R42 DIZZINESS AND GIDDINESS: ICD-10-CM

## 2023-05-10 DIAGNOSIS — N39.0 URINARY TRACT INFECTION, SITE NOT SPECIFIED: ICD-10-CM

## 2023-05-10 DIAGNOSIS — Z88.8 ALLERGY STATUS TO OTHER DRUGS, MEDICAMENTS AND BIOLOGICAL SUBSTANCES: ICD-10-CM

## 2023-05-10 DIAGNOSIS — E78.5 HYPERLIPIDEMIA, UNSPECIFIED: ICD-10-CM

## 2023-05-10 DIAGNOSIS — R19.7 DIARRHEA, UNSPECIFIED: ICD-10-CM

## 2023-05-10 DIAGNOSIS — Z79.82 LONG TERM (CURRENT) USE OF ASPIRIN: ICD-10-CM

## 2023-05-10 DIAGNOSIS — Z79.84 LONG TERM (CURRENT) USE OF ORAL HYPOGLYCEMIC DRUGS: ICD-10-CM

## 2023-05-10 DIAGNOSIS — R11.0 NAUSEA: ICD-10-CM

## 2023-05-10 DIAGNOSIS — M17.11 UNILATERAL PRIMARY OSTEOARTHRITIS, RIGHT KNEE: ICD-10-CM

## 2023-05-10 DIAGNOSIS — Z88.2 ALLERGY STATUS TO SULFONAMIDES: ICD-10-CM

## 2023-05-10 DIAGNOSIS — I10 ESSENTIAL (PRIMARY) HYPERTENSION: ICD-10-CM

## 2023-05-10 DIAGNOSIS — Z87.440 PERSONAL HISTORY OF URINARY (TRACT) INFECTIONS: ICD-10-CM

## 2023-05-10 DIAGNOSIS — E11.9 TYPE 2 DIABETES MELLITUS WITHOUT COMPLICATIONS: ICD-10-CM

## 2023-05-10 DIAGNOSIS — R53.1 WEAKNESS: ICD-10-CM

## 2023-05-10 LAB
ALBUMIN SERPL ELPH-MCNC: 4.6 G/DL — SIGNIFICANT CHANGE UP (ref 3.5–5.2)
ALP SERPL-CCNC: 102 U/L — SIGNIFICANT CHANGE UP (ref 30–115)
ALT FLD-CCNC: 10 U/L — SIGNIFICANT CHANGE UP (ref 0–41)
ANION GAP SERPL CALC-SCNC: 12 MMOL/L — SIGNIFICANT CHANGE UP (ref 7–14)
APPEARANCE UR: CLEAR — SIGNIFICANT CHANGE UP
AST SERPL-CCNC: 21 U/L — SIGNIFICANT CHANGE UP (ref 0–41)
BACTERIA # UR AUTO: ABNORMAL
BILIRUB SERPL-MCNC: 0.5 MG/DL — SIGNIFICANT CHANGE UP (ref 0.2–1.2)
BILIRUB UR-MCNC: NEGATIVE — SIGNIFICANT CHANGE UP
BUN SERPL-MCNC: 13 MG/DL — SIGNIFICANT CHANGE UP (ref 10–20)
CALCIUM SERPL-MCNC: 10.2 MG/DL — SIGNIFICANT CHANGE UP (ref 8.4–10.4)
CHLORIDE SERPL-SCNC: 102 MMOL/L — SIGNIFICANT CHANGE UP (ref 98–110)
CO2 SERPL-SCNC: 24 MMOL/L — SIGNIFICANT CHANGE UP (ref 17–32)
COLOR SPEC: YELLOW — SIGNIFICANT CHANGE UP
CREAT SERPL-MCNC: 0.6 MG/DL — LOW (ref 0.7–1.5)
DIFF PNL FLD: ABNORMAL
EGFR: 91 ML/MIN/1.73M2 — SIGNIFICANT CHANGE UP
EPI CELLS # UR: 1 /HPF — SIGNIFICANT CHANGE UP (ref 0–5)
GLUCOSE SERPL-MCNC: 101 MG/DL — HIGH (ref 70–99)
GLUCOSE UR QL: NEGATIVE — SIGNIFICANT CHANGE UP
HCT VFR BLD CALC: 39.5 % — SIGNIFICANT CHANGE UP (ref 37–47)
HGB BLD-MCNC: 12.8 G/DL — SIGNIFICANT CHANGE UP (ref 12–16)
HYALINE CASTS # UR AUTO: 2 /LPF — SIGNIFICANT CHANGE UP (ref 0–7)
KETONES UR-MCNC: NEGATIVE — SIGNIFICANT CHANGE UP
LEUKOCYTE ESTERASE UR-ACNC: ABNORMAL
MCHC RBC-ENTMCNC: 26.5 PG — LOW (ref 27–31)
MCHC RBC-ENTMCNC: 32.4 G/DL — SIGNIFICANT CHANGE UP (ref 32–37)
MCV RBC AUTO: 81.8 FL — SIGNIFICANT CHANGE UP (ref 81–99)
NITRITE UR-MCNC: POSITIVE
NRBC # BLD: 0 /100 WBCS — SIGNIFICANT CHANGE UP (ref 0–0)
PH UR: 7.5 — SIGNIFICANT CHANGE UP (ref 5–8)
PLATELET # BLD AUTO: 258 K/UL — SIGNIFICANT CHANGE UP (ref 130–400)
PMV BLD: 10 FL — SIGNIFICANT CHANGE UP (ref 7.4–10.4)
POTASSIUM SERPL-MCNC: 4.7 MMOL/L — SIGNIFICANT CHANGE UP (ref 3.5–5)
POTASSIUM SERPL-SCNC: 4.7 MMOL/L — SIGNIFICANT CHANGE UP (ref 3.5–5)
PROT SERPL-MCNC: 7.4 G/DL — SIGNIFICANT CHANGE UP (ref 6–8)
PROT UR-MCNC: NEGATIVE — SIGNIFICANT CHANGE UP
RBC # BLD: 4.83 M/UL — SIGNIFICANT CHANGE UP (ref 4.2–5.4)
RBC # FLD: 15 % — HIGH (ref 11.5–14.5)
RBC CASTS # UR COMP ASSIST: 5 /HPF — HIGH (ref 0–4)
SODIUM SERPL-SCNC: 138 MMOL/L — SIGNIFICANT CHANGE UP (ref 135–146)
SP GR SPEC: 1.02 — SIGNIFICANT CHANGE UP (ref 1.01–1.03)
TROPONIN T SERPL-MCNC: <0.01 NG/ML — SIGNIFICANT CHANGE UP
UROBILINOGEN FLD QL: SIGNIFICANT CHANGE UP
WBC # BLD: 6.64 K/UL — SIGNIFICANT CHANGE UP (ref 4.8–10.8)
WBC # FLD AUTO: 6.64 K/UL — SIGNIFICANT CHANGE UP (ref 4.8–10.8)
WBC UR QL: 21 /HPF — HIGH (ref 0–5)

## 2023-05-10 PROCEDURE — 81001 URINALYSIS AUTO W/SCOPE: CPT

## 2023-05-10 PROCEDURE — 70450 CT HEAD/BRAIN W/O DYE: CPT | Mod: 26,MA

## 2023-05-10 PROCEDURE — 93010 ELECTROCARDIOGRAM REPORT: CPT

## 2023-05-10 PROCEDURE — 36415 COLL VENOUS BLD VENIPUNCTURE: CPT

## 2023-05-10 PROCEDURE — 93005 ELECTROCARDIOGRAM TRACING: CPT

## 2023-05-10 PROCEDURE — 99284 EMERGENCY DEPT VISIT MOD MDM: CPT

## 2023-05-10 PROCEDURE — 85027 COMPLETE CBC AUTOMATED: CPT

## 2023-05-10 PROCEDURE — 96374 THER/PROPH/DIAG INJ IV PUSH: CPT

## 2023-05-10 PROCEDURE — 99285 EMERGENCY DEPT VISIT HI MDM: CPT | Mod: 25

## 2023-05-10 PROCEDURE — 70450 CT HEAD/BRAIN W/O DYE: CPT | Mod: MA

## 2023-05-10 PROCEDURE — 80053 COMPREHEN METABOLIC PANEL: CPT

## 2023-05-10 PROCEDURE — 84484 ASSAY OF TROPONIN QUANT: CPT

## 2023-05-10 RX ORDER — SODIUM CHLORIDE 9 MG/ML
1000 INJECTION INTRAMUSCULAR; INTRAVENOUS; SUBCUTANEOUS ONCE
Refills: 0 | Status: COMPLETED | OUTPATIENT
Start: 2023-05-10 | End: 2023-05-10

## 2023-05-10 RX ORDER — CEFTRIAXONE 500 MG/1
1000 INJECTION, POWDER, FOR SOLUTION INTRAMUSCULAR; INTRAVENOUS ONCE
Refills: 0 | Status: COMPLETED | OUTPATIENT
Start: 2023-05-10 | End: 2023-05-10

## 2023-05-10 RX ORDER — NITROFURANTOIN MACROCRYSTAL 50 MG
1 CAPSULE ORAL
Qty: 10 | Refills: 0
Start: 2023-05-10 | End: 2023-05-14

## 2023-05-10 RX ADMIN — SODIUM CHLORIDE 1000 MILLILITER(S): 9 INJECTION INTRAMUSCULAR; INTRAVENOUS; SUBCUTANEOUS at 15:57

## 2023-05-10 RX ADMIN — CEFTRIAXONE 100 MILLIGRAM(S): 500 INJECTION, POWDER, FOR SOLUTION INTRAMUSCULAR; INTRAVENOUS at 18:19

## 2023-05-10 NOTE — ED ADULT NURSE NOTE - OBJECTIVE STATEMENT
C/o dizziness, nausea and headache that started this morning. Admits vertigo and states that room is spinning.

## 2023-05-10 NOTE — ED ADULT NURSE NOTE - NSFALLRISKINTERV_ED_ALL_ED

## 2023-05-10 NOTE — ED PROVIDER NOTE - PATIENT PORTAL LINK FT
You can access the FollowMyHealth Patient Portal offered by [facility] by registering at the following website: [email or web address]. By joining [email or web address] portal, you will also be able to view your health information using other applications (apps) compatible with our system.

## 2023-05-10 NOTE — ED PROVIDER NOTE - OBJECTIVE STATEMENT
Jewell Tuttle is a 78 yo Nigerian-speaking woman, PMH htn, dm, multiple uti, OA R knee here for dizziness since waking up this morning. Patient reports she had diarrhea 6 times yesterday. She reports she woke up this morning and got up too quickly which resulted in her becoming dizzy. She reports feeling lightheaded, dizzy, nauseous, with a headache to the back of her head. Patient denies emesis, dysuria, SOB, CP, abd pain, visual changes, weakness.

## 2023-05-10 NOTE — ED PROVIDER NOTE - ATTENDING CONTRIBUTION TO CARE
I personally evaluated the patient. I reviewed the Resident´s or Physician Assistant´s note (as assigned above), and agree with the findings and plan except as documented in my note.    77-year-old female presents complaining of intermittent lightheadedness, worse with standing, today.  Admits to working in her yard yesterday with limited p.o. replenishment.  Also admits to diarrhea, nonbloody, non-mucousy, no sick contacts, unknown provocation but is now since resolved.  Today noted weakness and lightheadedness with difficulty ambulating due to the symptoms, nonsustained, and not associated with vision changes, hearing changes, vomiting or other constitutional symptoms.  There are no fevers.  The review of systems is otherwise unremarkable    GENERAL: female in no distress.   HEENT: EOMI non icteric no facial droop  NECK: FROM  CHEST: normal work of breathing noted. CTA bilateral.   CV: pulses intact S1S2 regular  ABD: soft, non rigid, non distended  EXTR: FROM   NEURO: AAO 3 no focal deficits  SKIN: normal no pallor  PSYCH: normal mood & mentation      Impression: Weakness    Plan: IV, labs, imaging, supportive care & reevaluation

## 2023-05-10 NOTE — ED PROVIDER NOTE - NSICDXPASTMEDICALHX_GEN_ALL_CORE_FT
PAST MEDICAL HISTORY:  Diabetes     HTN (hypertension)     Hyperlipidemia     UTI (urinary tract infection)

## 2023-05-10 NOTE — ED PROVIDER NOTE - CARE PROVIDER_API CALL
TROY RIGGS  Internal Medicine  KATHI SIMMONS, Phys,    Phone: [phone]  Fax: [phone]  Established Patient  Follow Up Time: 4-6 Days

## 2023-05-10 NOTE — ED PROVIDER NOTE - NS ED ROS FT
Review of Systems:   Constitutional: No fever, chills.  Eyes:  No visual changes  ENMT:  No neck pain  Cardiac:  No chest pain  Respiratory:  No cough, SOB  GI:  +nausea, no vomiting, diarrhea, abdominal pain.  :  No dysuria, hematuria  MS:  No back pain.  Neuro:  +headache or lightheadedness/dizziness  Skin:  No skin rash

## 2023-05-10 NOTE — ED ADULT TRIAGE NOTE - CHIEF COMPLAINT QUOTE
pt c/o dizziness, nausea and headache x1 day. denies blurry vision. no neuromotor defecits in triage.

## 2023-05-10 NOTE — ED PROVIDER NOTE - PHYSICAL EXAMINATION
General: well-appearing in NAD. AAO x 3.  HEENT: Normocephalic, nontraumatic.   LUNGS: Clear to auscultation b/l.   HEART: RRR. S1/S2 present.  ABDOMEN: Nontender, nondistended. + bowel sounds.   EXT: Pulses palpable. Nonedematous.   SKIN: Warm, dry.

## 2023-05-10 NOTE — ED PROVIDER NOTE - PROGRESS NOTE DETAILS
Dr. Story - Patient states she feels better with IV fluids, results of imaging and labs discussed with patient and family with questions answered, plan is for outpatient management improved p.o. intake.  Return instructions provided.

## 2023-05-10 NOTE — ED PROVIDER NOTE - CLINICAL SUMMARY MEDICAL DECISION MAKING FREE TEXT BOX
77-year-old female presents to the emergency department complaining weakness.  Had volume depletion recently as well as exertion with limited p.o. replenishment.  In the emergency department had screening labs, imaging, reevaluation, supportive care with identification of UTI, patient feels better after ED treatment, will discharge with outpatient management and return and follow-up instructions. Results of testing in ED d/w patient and family in detail with questions answered for medical decision making.

## 2024-10-15 ENCOUNTER — EMERGENCY (EMERGENCY)
Facility: HOSPITAL | Age: 79
LOS: 0 days | Discharge: HOME | End: 2024-10-15
Attending: STUDENT IN AN ORGANIZED HEALTH CARE EDUCATION/TRAINING PROGRAM
Payer: MEDICARE

## 2024-10-15 VITALS
DIASTOLIC BLOOD PRESSURE: 77 MMHG | SYSTOLIC BLOOD PRESSURE: 201 MMHG | HEART RATE: 76 BPM | TEMPERATURE: 99 F | OXYGEN SATURATION: 99 % | WEIGHT: 149.91 LBS | RESPIRATION RATE: 18 BRPM

## 2024-10-15 VITALS — DIASTOLIC BLOOD PRESSURE: 76 MMHG | SYSTOLIC BLOOD PRESSURE: 193 MMHG

## 2024-10-15 DIAGNOSIS — E78.5 HYPERLIPIDEMIA, UNSPECIFIED: ICD-10-CM

## 2024-10-15 DIAGNOSIS — Z88.8 ALLERGY STATUS TO OTHER DRUGS, MEDICAMENTS AND BIOLOGICAL SUBSTANCES: ICD-10-CM

## 2024-10-15 DIAGNOSIS — Z88.2 ALLERGY STATUS TO SULFONAMIDES: ICD-10-CM

## 2024-10-15 DIAGNOSIS — I10 ESSENTIAL (PRIMARY) HYPERTENSION: ICD-10-CM

## 2024-10-15 DIAGNOSIS — R51.9 HEADACHE, UNSPECIFIED: ICD-10-CM

## 2024-10-15 PROBLEM — N39.0 URINARY TRACT INFECTION, SITE NOT SPECIFIED: Chronic | Status: ACTIVE | Noted: 2023-05-10

## 2024-10-15 PROBLEM — E11.9 TYPE 2 DIABETES MELLITUS WITHOUT COMPLICATIONS: Chronic | Status: ACTIVE | Noted: 2023-05-10

## 2024-10-15 PROCEDURE — 93308 TTE F-UP OR LMTD: CPT | Mod: 26

## 2024-10-15 PROCEDURE — 99284 EMERGENCY DEPT VISIT MOD MDM: CPT

## 2024-10-15 PROCEDURE — 93308 TTE F-UP OR LMTD: CPT

## 2024-10-15 PROCEDURE — 99284 EMERGENCY DEPT VISIT MOD MDM: CPT | Mod: 25

## 2024-10-15 NOTE — ED PROVIDER NOTE - PATIENT PORTAL LINK FT
You can access the FollowMyHealth Patient Portal offered by [facility] by registering at the following website: [email or web address]. By joining [employer]’s FollowMyHealth portal, you will also be able to view your health information using other applications (apps) compatible with our system.

## 2024-10-15 NOTE — ED PROVIDER NOTE - CLINICAL SUMMARY MEDICAL DECISION MAKING FREE TEXT BOX
I speak Venezuelan well enough to obtain history and physical    79-year-old female history of hypertension, hyperlipidemia presenting with elevated blood pressure. Patient went to the dentist for dental work and had elevated blood pressure readings. Was advised to follow-up with the ED. Takes her blood pressure medicines as prescribed. Had a headache 3 days ago which now resolved. No chest pain or shortness of breath or fever abdominal pain or urinary symptoms, no nausea vomiting or diarrhea, no focal weakness numbness or tingling, no vision changes, no slurred speech, no facial droop, no hematuria, no flank pain.    On exam, vitals reviewed. Patient is indeed hypertensive. Agree with exam as documented by PA above. No evidence of endorgan damage on history or physical. Patient is asymptomatic, history and physical consistent with asymptomatic hypertension.    Echo unremarkable. No LVH.     Advised close follow-up with primary care for blood pressure management. Return precautions discussed. All questions answered. Patient verbalized understanding the plan

## 2024-10-15 NOTE — ED PROVIDER NOTE - OBJECTIVE STATEMENT
80 yo female pmhx of HTN, HLD presents for eval of HTN. PT w/ elevated BP readings while at dentist today sent to ED for further eval. Reports good compliance w her BP meds. States that she had a mild HA 3 days ago, but now has improved. No CP, SOB, fever, abd pain, dysuria, n/v/d, vision changes, weakness.     Dr. Atkins providing Setswana interpretation

## 2024-10-15 NOTE — ED ADULT TRIAGE NOTE - CHIEF COMPLAINT QUOTE
Sent in from dentist for high BP reading = 205/84. As per pt., compliant with meds, also c/o mild headache

## 2024-10-15 NOTE — ED PROVIDER NOTE - NSFOLLOWUPINSTRUCTIONS_ED_ALL_ED_FT
Hipertensión en los adultos  Hypertension, Adult  La presión arterial zayda (hipertensión) se produce cuando la fuerza de la barrett bombea a través de las arterias con mucha fuerza. Las arterias son los vasos sanguíneos que transportan la barrett desde el corazón al luis antonio del cuerpo. La hipertensión hace que el corazón keshawn más esfuerzo para bombear barrett y puede provocar que las arterias se estrechen o endurezcan. La hipertensión no tratada o no controlada puede causar infarto de miocardio, insuficiencia cardíaca, accidente cerebrovascular, enfermedad renal y otros problemas.    Judith lectura de la presión arterial consta de un número más alto sobre un número más bajo. En condiciones ideales, la presión arterial debe estar por debajo de 120/80. El primer número (“superior”) es la presión sistólica. Es la medida de la presión de las arterias cuando el corazón late. El leti número (“inferior”) es la presión diastólica. Es la medida de la presión en las arterias cuando el corazón se relaja.    ¿Cuáles son las causas?  Se desconoce la causa exacta de esta afección. Hay algunas afecciones que causan presión arterial zayda.    ¿Qué incrementa el riesgo?  Ciertos factores pueden hacer que judith persona sea más propensa a desarrollar presión arterial zayda. Algunos de estos factores de riesgo están bajo owens control, por ejemplo, los siguientes:  Fumar.  No hacer la cantidad suficiente de actividad física o ejercicio.  Tener sobrepeso.  Consumir mucha grasa, azúcar, calorías o sal (sodio) en owens dieta.  Beber alcohol en exceso.  Otros factores de riesgo son los siguientes:  Tener antecedentes personales de enfermedad cardíaca, diabetes, colesterol alto o enfermedad renal.  Estrés.  Tener antecedentes familiares de presión arterial zayda y colesterol alto.  Tener apnea obstructiva del sueño.  Edad. El riesgo aumenta con la edad.  ¿Cuáles son los signos o síntomas?  Es posible que la presión arterial zayda no cause síntomas. La presión arterial muy zayda (crisis hipertensiva) puede provocar:  Dolor de virginia.  Latidos cardíacos acelerados o irregulares (palpitaciones).  Falta de aire.  Hemorragia nasal.  Náuseas y vómitos.  Cambios en la visión.  Dolor intenso en el pecho, mareos y convulsiones.  ¿Cómo se diagnostica?  Esta afección se diagnostica al medir owens presión arterial mientras se encuentra sentado, con el brazo apoyado sobre judith superficie plana, las piernas sin cruzar y los pies sg apoyados en el piso. El brazalete del tensiómetro debe colocarse directamente sobre la piel de la parte superior del brazo y al nivel de owens corazón. La presión arterial debe medirse al menos dos veces en el mismo brazo. Determinadas condiciones pueden causar judith diferencia de presión arterial entre el brazo elizabeth y el derecho.    Si tiene judith lectura de presión arterial zayda dina judith visita o si tiene presión arterial normal con otros factores de riesgo, se le podrá pedir que kehsawn lo siguiente:  Que regrese otro día para volver a controlar owens presión arterial nuevamente.  Que se controle la presión arterial en owens casa dina 1 semana o más.  Si se le diagnostica hipertensión, es posible que se le realicen otros análisis de barrett o estudios de diagnóstico por imágenes para ayudar a owens médico a comprender owens riesgo general de tener otras afecciones.    ¿Cómo se trata?  Esta afección se trata haciendo cambios saludables en el estilo de ronel, tales vinod ingerir alimentos saludables, realizar más ejercicio y reducir el consumo de alcohol. Es posible que lo deriven para que reciba asesoramiento sobre judith dieta saludable y actividad física.    El médico puede recetarle medicamentos si los cambios en el estilo de ronel no son suficientes para lograr controlar la presión arterial y si:  Owens presión arterial sistólica está por encima de 130.  Owens presión arterial diastólica está por encima de 80.  La presión arterial deseada puede variar en función de las enfermedades, la edad y otros factores personales.    Siga estas instrucciones en owens casa:  Comida y bebida    A plate with examples of foods in a healthy diet.  Siga judith dieta con alto contenido de fibras y potasio, y con bajo contenido de sodio, azúcar agregada y grasas. Un ejemplo de francisco plan de alimentación se denomina dieta DASH. DASH es la sigla en inglés de “Enfoques alimentarios para detener la hipertensión”. Para alimentarse de esta manera:  Coma mucha fruta y verdura fresca. Trate de que la mitad del plato de cada comida sea de frutas y verduras.  Coma cereales integrales, vinod pasta integral, arroz integral o pan integral. Llene aproximadamente un cuarto del plato con cereales integrales.  Coma y fara productos lácteos con bajo contenido de grasa, vinod leche descremada o yogur bajo en grasas.  Evite la ingesta de marinelli de carne grasa, carne procesada o curada, y carne de ave con piel. Llene aproximadamente un cuarto del plato con proteínas magras, vinod pescado, siddharth sin piel, frijoles, huevos o tofu.  Evite ingerir alimentos prehechos y procesados. En general, estos tienen mayor cantidad de sodio, azúcar agregada y grasa.  Reduzca owens ingesta diaria de sodio. Muchas personas que tienen hipertensión deben comer menos de 1,500 mg de sodio por día.  No fara alcohol si:  Owens médico le indica no hacerlo.  Está embarazada, puede estar embarazada o está tratando de quedar embarazada.  Si sami alcohol:  Limite la cantidad que sami a lo siguiente:  De 0 a 1 medida por día para las mujeres.  De 0 a 2 medidas por día para los hombres.  Sepa cuánta cantidad de alcohol hay en las bebidas que desiree. En [place], judith medida equivale a judith botella de cerveza de 12 oz (355 ml), un vaso de vino de 5 oz (148 ml) o un vaso de judith bebida alcohólica de zayda graduación de 1½ oz (44 ml).  Estilo de ronel    A blood pressure monitor and cuff.   Trabaje con owens médico para mantener un peso saludable o perder peso. Pregúntele cuál es el peso recomendado para usted.  Realice al menos 30 minutos de ejercicio que keshawn que se acelere owens corazón (ejercicio aeróbico) la mayoría de los días de la semana. Estas actividades pueden incluir caminar, nadar o andar en bicicleta.  Incluya ejercicios para fortalecer anjana músculos (ejercicios de resistencia), vinod [place] o levantamiento de pesas, vinod parte de owens rutina semanal de ejercicios. Intente realizar 30 minutos de francisco tipo de ejercicios al menos gold días a la semana.  No consuma ningún producto que contenga nicotina o tabaco. Estos productos incluyen cigarrillos, tabaco para mascar y aparatos de vapeo, vinod los cigarrillos electrónicos. Si necesita ayuda para dejar de fumar, consulte al médico.  Contrólese la presión arterial en owens casa según las indicaciones del médico.  Concurra a todas las visitas de seguimiento. [place] es importante.  Medicamentos    Use los medicamentos de venta leonel y los recetados solamente vinod se lo haya indicado el médico. Siga cuidadosamente las indicaciones. Los medicamentos para la presión arterial deben tomarse según las indicaciones.  No omita las dosis de medicamentos para la presión arterial. Si lo hace, estará en riesgo de tener problemas y puede hacer que los medicamentos damian menos eficaces.  Pregúntele a owens médico a qué efectos secundarios o reacciones a los medicamentos debe prestar atención.  Comuníquese con un médico si:  Piensa que tiene judith reacción a un medicamento que está tomando.  Tiene ángel de virginia frecuentes (recurrentes).  Se siente mareado.  Tiene hinchazón en los tobillos.  Tiene problemas de visión.  Solicite ayuda inmediatamente si:  Siente un dolor de virgin[place] intenso o confusión.  Siente debilidad inusual o adormecimiento.  Siente que va a desmayarse.  Siente un dolor intenso en el pecho o el abdomen.  Vomita repetidas veces.  Tiene dificultad para respirar.  Estos síntomas pueden indicar judith emergencia. Solicite ayuda de inmediato. Llame al 911.  No espere a lang si los síntomas desaparecen.  No conduzca por anjana propios medios hasta el hospital.  Resumen  La hipertensión se produce cuando la barrett bombea en las arterias con mucha fuerza. Si esta afección no se controla, podría correr riesgo de tener complicaciones graves.  La presión arterial deseada puede variar en función de las enfermedades, la edad y otros factores personales. Para la mayoría de las personas, judith presión arterial normal es neftali que 120/80.  La hipertensión se trata con cambios en el estilo de ronel, medicamentos o judith combinación de ambos. Los cambios en el estilo de ronel incluyen pérdida de peso, ingerir alimentos sanos, seguir judith dieta baja en sodio, hacer más ejercicio y limitar el consumo de alcohol.  Esta información no tiene vinod fin reemplazar el consejo del médico. Asegúrese de hacerle al médico cualquier pregunta que tenga.

## 2024-11-16 ENCOUNTER — EMERGENCY (EMERGENCY)
Facility: HOSPITAL | Age: 79
LOS: 0 days | Discharge: HOME | End: 2024-11-16
Attending: EMERGENCY MEDICINE
Payer: MEDICARE

## 2024-11-16 VITALS
RESPIRATION RATE: 18 BRPM | HEART RATE: 68 BPM | SYSTOLIC BLOOD PRESSURE: 149 MMHG | DIASTOLIC BLOOD PRESSURE: 76 MMHG | OXYGEN SATURATION: 98 %

## 2024-11-16 VITALS
RESPIRATION RATE: 17 BRPM | HEART RATE: 75 BPM | DIASTOLIC BLOOD PRESSURE: 85 MMHG | TEMPERATURE: 99 F | OXYGEN SATURATION: 96 % | SYSTOLIC BLOOD PRESSURE: 153 MMHG | WEIGHT: 149.91 LBS | HEIGHT: 62 IN

## 2024-11-16 DIAGNOSIS — E78.5 HYPERLIPIDEMIA, UNSPECIFIED: ICD-10-CM

## 2024-11-16 DIAGNOSIS — Z88.2 ALLERGY STATUS TO SULFONAMIDES: ICD-10-CM

## 2024-11-16 DIAGNOSIS — M54.50 LOW BACK PAIN, UNSPECIFIED: ICD-10-CM

## 2024-11-16 DIAGNOSIS — Z88.8 ALLERGY STATUS TO OTHER DRUGS, MEDICAMENTS AND BIOLOGICAL SUBSTANCES: ICD-10-CM

## 2024-11-16 DIAGNOSIS — I10 ESSENTIAL (PRIMARY) HYPERTENSION: ICD-10-CM

## 2024-11-16 DIAGNOSIS — K52.9 NONINFECTIVE GASTROENTERITIS AND COLITIS, UNSPECIFIED: ICD-10-CM

## 2024-11-16 LAB
ALBUMIN SERPL ELPH-MCNC: 4.3 G/DL — SIGNIFICANT CHANGE UP (ref 3.5–5.2)
ALP SERPL-CCNC: 96 U/L — SIGNIFICANT CHANGE UP (ref 30–115)
ALT FLD-CCNC: 11 U/L — SIGNIFICANT CHANGE UP (ref 0–41)
ANION GAP SERPL CALC-SCNC: 10 MMOL/L — SIGNIFICANT CHANGE UP (ref 7–14)
APPEARANCE UR: CLEAR — SIGNIFICANT CHANGE UP
AST SERPL-CCNC: 19 U/L — SIGNIFICANT CHANGE UP (ref 0–41)
BASOPHILS # BLD AUTO: 0.03 K/UL — SIGNIFICANT CHANGE UP (ref 0–0.2)
BASOPHILS NFR BLD AUTO: 0.5 % — SIGNIFICANT CHANGE UP (ref 0–1)
BILIRUB SERPL-MCNC: 0.4 MG/DL — SIGNIFICANT CHANGE UP (ref 0.2–1.2)
BILIRUB UR-MCNC: NEGATIVE — SIGNIFICANT CHANGE UP
BUN SERPL-MCNC: 16 MG/DL — SIGNIFICANT CHANGE UP (ref 10–20)
CALCIUM SERPL-MCNC: 9.8 MG/DL — SIGNIFICANT CHANGE UP (ref 8.4–10.5)
CHLORIDE SERPL-SCNC: 102 MMOL/L — SIGNIFICANT CHANGE UP (ref 98–110)
CO2 SERPL-SCNC: 25 MMOL/L — SIGNIFICANT CHANGE UP (ref 17–32)
COLOR SPEC: YELLOW — SIGNIFICANT CHANGE UP
CREAT SERPL-MCNC: 0.8 MG/DL — SIGNIFICANT CHANGE UP (ref 0.7–1.5)
DIFF PNL FLD: NEGATIVE — SIGNIFICANT CHANGE UP
EGFR: 74 ML/MIN/1.73M2 — SIGNIFICANT CHANGE UP
EGFR: 74 ML/MIN/1.73M2 — SIGNIFICANT CHANGE UP
EOSINOPHIL # BLD AUTO: 0.12 K/UL — SIGNIFICANT CHANGE UP (ref 0–0.7)
EOSINOPHIL NFR BLD AUTO: 1.9 % — SIGNIFICANT CHANGE UP (ref 0–8)
GLUCOSE SERPL-MCNC: 94 MG/DL — SIGNIFICANT CHANGE UP (ref 70–99)
GLUCOSE UR QL: NEGATIVE MG/DL — SIGNIFICANT CHANGE UP
HCT VFR BLD CALC: 36.9 % — LOW (ref 37–47)
HGB BLD-MCNC: 11.9 G/DL — LOW (ref 12–16)
IMM GRANULOCYTES NFR BLD AUTO: 0.6 % — HIGH (ref 0.1–0.3)
KETONES UR-MCNC: NEGATIVE MG/DL — SIGNIFICANT CHANGE UP
LACTATE SERPL-SCNC: 1.1 MMOL/L — SIGNIFICANT CHANGE UP (ref 0.7–2)
LEUKOCYTE ESTERASE UR-ACNC: ABNORMAL
LIDOCAIN IGE QN: 26 U/L — SIGNIFICANT CHANGE UP (ref 7–60)
LYMPHOCYTES # BLD AUTO: 2.31 K/UL — SIGNIFICANT CHANGE UP (ref 1.2–3.4)
LYMPHOCYTES # BLD AUTO: 36.8 % — SIGNIFICANT CHANGE UP (ref 20.5–51.1)
MCHC RBC-ENTMCNC: 27.4 PG — SIGNIFICANT CHANGE UP (ref 27–31)
MCHC RBC-ENTMCNC: 32.2 G/DL — SIGNIFICANT CHANGE UP (ref 32–37)
MCV RBC AUTO: 84.8 FL — SIGNIFICANT CHANGE UP (ref 81–99)
MONOCYTES # BLD AUTO: 0.49 K/UL — SIGNIFICANT CHANGE UP (ref 0.1–0.6)
MONOCYTES NFR BLD AUTO: 7.8 % — SIGNIFICANT CHANGE UP (ref 1.7–9.3)
NEUTROPHILS # BLD AUTO: 3.29 K/UL — SIGNIFICANT CHANGE UP (ref 1.4–6.5)
NEUTROPHILS NFR BLD AUTO: 52.4 % — SIGNIFICANT CHANGE UP (ref 42.2–75.2)
NITRITE UR-MCNC: NEGATIVE — SIGNIFICANT CHANGE UP
NRBC # BLD: 0 /100 WBCS — SIGNIFICANT CHANGE UP (ref 0–0)
NRBC BLD-RTO: 0 /100 WBCS — SIGNIFICANT CHANGE UP (ref 0–0)
PH UR: 6.5 — SIGNIFICANT CHANGE UP (ref 5–8)
PLATELET # BLD AUTO: 258 K/UL — SIGNIFICANT CHANGE UP (ref 130–400)
PMV BLD: 9.8 FL — SIGNIFICANT CHANGE UP (ref 7.4–10.4)
POTASSIUM SERPL-MCNC: 4.4 MMOL/L — SIGNIFICANT CHANGE UP (ref 3.5–5)
POTASSIUM SERPL-SCNC: 4.4 MMOL/L — SIGNIFICANT CHANGE UP (ref 3.5–5)
PROT SERPL-MCNC: 6.5 G/DL — SIGNIFICANT CHANGE UP (ref 6–8)
PROT UR-MCNC: NEGATIVE MG/DL — SIGNIFICANT CHANGE UP
RBC # BLD: 4.35 M/UL — SIGNIFICANT CHANGE UP (ref 4.2–5.4)
RBC # FLD: 15.2 % — HIGH (ref 11.5–14.5)
SODIUM SERPL-SCNC: 137 MMOL/L — SIGNIFICANT CHANGE UP (ref 135–146)
SP GR SPEC: 1.01 — SIGNIFICANT CHANGE UP (ref 1–1.03)
UROBILINOGEN FLD QL: 0.2 MG/DL — SIGNIFICANT CHANGE UP (ref 0.2–1)
WBC # BLD: 6.28 K/UL — SIGNIFICANT CHANGE UP (ref 4.8–10.8)
WBC # FLD AUTO: 6.28 K/UL — SIGNIFICANT CHANGE UP (ref 4.8–10.8)

## 2024-11-16 PROCEDURE — 87186 SC STD MICRODIL/AGAR DIL: CPT

## 2024-11-16 PROCEDURE — 93005 ELECTROCARDIOGRAM TRACING: CPT

## 2024-11-16 PROCEDURE — 81001 URINALYSIS AUTO W/SCOPE: CPT

## 2024-11-16 PROCEDURE — 80053 COMPREHEN METABOLIC PANEL: CPT

## 2024-11-16 PROCEDURE — 87086 URINE CULTURE/COLONY COUNT: CPT

## 2024-11-16 PROCEDURE — 93010 ELECTROCARDIOGRAM REPORT: CPT

## 2024-11-16 PROCEDURE — 99285 EMERGENCY DEPT VISIT HI MDM: CPT

## 2024-11-16 PROCEDURE — 99285 EMERGENCY DEPT VISIT HI MDM: CPT | Mod: 25

## 2024-11-16 PROCEDURE — 74177 CT ABD & PELVIS W/CONTRAST: CPT | Mod: MC

## 2024-11-16 PROCEDURE — 85025 COMPLETE CBC W/AUTO DIFF WBC: CPT

## 2024-11-16 PROCEDURE — 36415 COLL VENOUS BLD VENIPUNCTURE: CPT

## 2024-11-16 PROCEDURE — 74177 CT ABD & PELVIS W/CONTRAST: CPT | Mod: 26,MC

## 2024-11-16 PROCEDURE — 83690 ASSAY OF LIPASE: CPT

## 2024-11-16 PROCEDURE — 83605 ASSAY OF LACTIC ACID: CPT

## 2024-11-16 RX ORDER — LIDOCAINE HYDROCHLORIDE 20 MG/ML
1 JELLY TOPICAL ONCE
Refills: 0 | Status: DISCONTINUED | OUTPATIENT
Start: 2024-11-16 | End: 2024-11-16

## 2024-11-20 RX ORDER — CEFUROXIME SODIUM 1.5 G
1 VIAL (EA) INJECTION
Qty: 14 | Refills: 0
Start: 2024-11-20 | End: 2024-11-26

## 2025-07-23 NOTE — ED ADULT TRIAGE NOTE - ACCOMPANIED BY
Goal Outcome Evaluation:  Plan of Care Reviewed With: patient        Progress: improving  Outcome Evaluation: Pt up in BR w/ OT. c/o pain 5/10. discussed home safety and pOC. 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 feel pt will not be safe at this time to return home to attempt to take care of herself and her sister, however she reports she doesn't have much help to take care of her. pt  able to amb short distances at a time before becoming fatigue and SOA requiring standing rest breaks, pt went up/down 3 steps and then fatigued quickly again. required another standing rest break. Pt tolerated AROM BLEs. feel pt would benfit from rehab to cont working on strength, mobiltiy and safety before returning home to be able to cont taking care of herself and her sister.                              Self